# Patient Record
Sex: MALE | Race: WHITE | Employment: OTHER | URBAN - METROPOLITAN AREA
[De-identification: names, ages, dates, MRNs, and addresses within clinical notes are randomized per-mention and may not be internally consistent; named-entity substitution may affect disease eponyms.]

---

## 2017-01-25 ENCOUNTER — TELEPHONE (OUTPATIENT)
Dept: FAMILY MEDICINE CLINIC | Age: 82
End: 2017-01-25

## 2017-06-27 ENCOUNTER — OFFICE VISIT (OUTPATIENT)
Dept: FAMILY MEDICINE CLINIC | Age: 82
End: 2017-06-27

## 2017-06-27 VITALS
HEIGHT: 66 IN | HEART RATE: 88 BPM | OXYGEN SATURATION: 98 % | WEIGHT: 107 LBS | BODY MASS INDEX: 17.19 KG/M2 | SYSTOLIC BLOOD PRESSURE: 132 MMHG | DIASTOLIC BLOOD PRESSURE: 70 MMHG

## 2017-06-27 DIAGNOSIS — R19.7 DIARRHEA, UNSPECIFIED TYPE: Primary | ICD-10-CM

## 2017-06-27 DIAGNOSIS — R10.9 ABDOMINAL CRAMPING: ICD-10-CM

## 2017-06-27 PROCEDURE — 99212 OFFICE O/P EST SF 10 MIN: CPT | Performed by: NURSE PRACTITIONER

## 2017-06-27 ASSESSMENT — ENCOUNTER SYMPTOMS
BLOOD IN STOOL: 0
ABDOMINAL DISTENTION: 0
VOMITING: 0
CONSTIPATION: 0
BELCHING: 0
FLATUS: 0
CRAMPS: 1
RECTAL PAIN: 0
NAUSEA: 0
ABDOMINAL PAIN: 1
ANAL BLEEDING: 0
DIARRHEA: 1
HEMATOCHEZIA: 0

## 2017-10-17 ENCOUNTER — OFFICE VISIT (OUTPATIENT)
Dept: FAMILY MEDICINE CLINIC | Age: 82
End: 2017-10-17

## 2017-10-17 VITALS
SYSTOLIC BLOOD PRESSURE: 124 MMHG | HEART RATE: 70 BPM | WEIGHT: 108 LBS | HEIGHT: 66 IN | OXYGEN SATURATION: 98 % | BODY MASS INDEX: 17.36 KG/M2 | DIASTOLIC BLOOD PRESSURE: 72 MMHG

## 2017-10-17 DIAGNOSIS — M80.00XD AGE-RELATED OSTEOPOROSIS WITH CURRENT PATHOLOGICAL FRACTURE WITH ROUTINE HEALING, SUBSEQUENT ENCOUNTER: ICD-10-CM

## 2017-10-17 DIAGNOSIS — E78.2 MIXED HYPERLIPIDEMIA: Primary | ICD-10-CM

## 2017-10-17 DIAGNOSIS — D64.9 ANEMIA, UNSPECIFIED TYPE: ICD-10-CM

## 2017-10-17 DIAGNOSIS — E78.2 MIXED HYPERLIPIDEMIA: ICD-10-CM

## 2017-10-17 LAB
A/G RATIO: 1.7 (ref 1.1–2.2)
ALBUMIN SERPL-MCNC: 4.3 G/DL (ref 3.4–5)
ALP BLD-CCNC: 97 U/L (ref 40–129)
ALT SERPL-CCNC: 7 U/L (ref 10–40)
ANION GAP SERPL CALCULATED.3IONS-SCNC: 17 MMOL/L (ref 3–16)
AST SERPL-CCNC: 26 U/L (ref 15–37)
BILIRUB SERPL-MCNC: 0.5 MG/DL (ref 0–1)
BUN BLDV-MCNC: 15 MG/DL (ref 7–20)
CALCIUM SERPL-MCNC: 9.6 MG/DL (ref 8.3–10.6)
CHLORIDE BLD-SCNC: 101 MMOL/L (ref 99–110)
CHOLESTEROL, TOTAL: 153 MG/DL (ref 0–199)
CO2: 26 MMOL/L (ref 21–32)
CREAT SERPL-MCNC: 0.8 MG/DL (ref 0.8–1.3)
GFR AFRICAN AMERICAN: >60
GFR NON-AFRICAN AMERICAN: >60
GLOBULIN: 2.5 G/DL
GLUCOSE BLD-MCNC: 94 MG/DL (ref 70–99)
HCT VFR BLD CALC: 38.3 % (ref 40.5–52.5)
HDLC SERPL-MCNC: 61 MG/DL (ref 40–60)
HEMOGLOBIN: 12.8 G/DL (ref 13.5–17.5)
LDL CHOLESTEROL CALCULATED: 80 MG/DL
MCH RBC QN AUTO: 32.3 PG (ref 26–34)
MCHC RBC AUTO-ENTMCNC: 33.5 G/DL (ref 31–36)
MCV RBC AUTO: 96.4 FL (ref 80–100)
PDW BLD-RTO: 14.1 % (ref 12.4–15.4)
PLATELET # BLD: 170 K/UL (ref 135–450)
PMV BLD AUTO: 10.2 FL (ref 5–10.5)
POTASSIUM SERPL-SCNC: 4.3 MMOL/L (ref 3.5–5.1)
RBC # BLD: 3.97 M/UL (ref 4.2–5.9)
SODIUM BLD-SCNC: 144 MMOL/L (ref 136–145)
TOTAL PROTEIN: 6.8 G/DL (ref 6.4–8.2)
TRIGL SERPL-MCNC: 61 MG/DL (ref 0–150)
VLDLC SERPL CALC-MCNC: 12 MG/DL
WBC # BLD: 5.8 K/UL (ref 4–11)

## 2017-10-17 PROCEDURE — 90662 IIV NO PRSV INCREASED AG IM: CPT | Performed by: FAMILY MEDICINE

## 2017-10-17 PROCEDURE — 99214 OFFICE O/P EST MOD 30 MIN: CPT | Performed by: FAMILY MEDICINE

## 2017-10-17 PROCEDURE — G0008 ADMIN INFLUENZA VIRUS VAC: HCPCS | Performed by: FAMILY MEDICINE

## 2017-10-17 ASSESSMENT — ENCOUNTER SYMPTOMS
CHEST TIGHTNESS: 0
RHINORRHEA: 1
COUGH: 0
ABDOMINAL DISTENTION: 0
COLOR CHANGE: 0
EYE REDNESS: 0
EYE DISCHARGE: 0
FACIAL SWELLING: 0
ABDOMINAL PAIN: 0
SHORTNESS OF BREATH: 0

## 2017-10-17 ASSESSMENT — PATIENT HEALTH QUESTIONNAIRE - PHQ9
SUM OF ALL RESPONSES TO PHQ QUESTIONS 1-9: 0
SUM OF ALL RESPONSES TO PHQ9 QUESTIONS 1 & 2: 0
1. LITTLE INTEREST OR PLEASURE IN DOING THINGS: 0
2. FEELING DOWN, DEPRESSED OR HOPELESS: 0

## 2017-10-17 NOTE — PROGRESS NOTES
Subjective:      Patient ID: Kyra Mederos is a 80 y.o. male. Recheck on anemia, hyperlipidemia, and osteoporosis with Compression fracture of lumbar and thoracic vertebra. Bulging disc L4-L5. Hx of failure to thrive with hx of wt loss, but has been stable for the past one and a half yrs. Has not good fitting dentures, per Car Stark, and he cant chew meats so he doesn't eat any. Used to be on fosamax for well over 5 yrs, has discontinued  Past Medical History:   Diagnosis Date    Hyperlipidemia 10/11/2013     Past Surgical History:   Procedure Laterality Date    BACK SURGERY       Social History     Social History    Marital status:      Spouse name: N/A    Number of children: N/A    Years of education: N/A     Occupational History    Not on file. Social History Main Topics    Smoking status: Former Smoker    Smokeless tobacco: Never Used    Alcohol use No    Drug use: Unknown    Sexual activity: Not on file     Other Topics Concern    Not on file     Social History Narrative    No narrative on file       Vitals:    10/17/17 0809   BP: 124/72   Site: Right Arm   Position: Sitting   Cuff Size: Large Adult   Pulse: 70   SpO2: 98%   Weight: 108 lb (49 kg)   Height: 5' 6\" (1.676 m)     Body mass index is 17.43 kg/m². Wt Readings from Last 3 Encounters:   10/17/17 108 lb (49 kg)   06/27/17 107 lb (48.5 kg)   05/11/16 109 lb (49.4 kg)     BP Readings from Last 3 Encounters:   10/17/17 124/72   06/27/17 132/70   05/11/16 112/64        HPI    Review of Systems   Constitutional: Positive for unexpected weight change. HENT: Positive for rhinorrhea. Negative for congestion and facial swelling. Eyes: Negative for discharge and redness. Respiratory: Negative for cough, chest tightness and shortness of breath. Cardiovascular: Negative for chest pain, palpitations and leg swelling. Gastrointestinal: Negative for abdominal distention and abdominal pain.    Genitourinary: Negative for flank

## 2017-12-27 ENCOUNTER — OFFICE VISIT (OUTPATIENT)
Dept: FAMILY MEDICINE CLINIC | Age: 82
End: 2017-12-27

## 2017-12-27 VITALS
HEART RATE: 70 BPM | HEIGHT: 66 IN | DIASTOLIC BLOOD PRESSURE: 86 MMHG | SYSTOLIC BLOOD PRESSURE: 144 MMHG | WEIGHT: 108 LBS | OXYGEN SATURATION: 95 % | BODY MASS INDEX: 17.36 KG/M2

## 2017-12-27 DIAGNOSIS — S22.000A COMPRESSION FRACTURE OF BODY OF THORACIC VERTEBRA (HCC): ICD-10-CM

## 2017-12-27 DIAGNOSIS — K59.00 CONSTIPATION, UNSPECIFIED CONSTIPATION TYPE: ICD-10-CM

## 2017-12-27 DIAGNOSIS — Z23 IMMUNIZATION DUE: ICD-10-CM

## 2017-12-27 DIAGNOSIS — M54.50 ACUTE MIDLINE LOW BACK PAIN WITHOUT SCIATICA: Primary | ICD-10-CM

## 2017-12-27 DIAGNOSIS — R91.8 HILAR MASS: ICD-10-CM

## 2017-12-27 DIAGNOSIS — Z23 NEED FOR PROPHYLACTIC VACCINATION WITH TETANUS-DIPHTHERIA (TD): ICD-10-CM

## 2017-12-27 PROCEDURE — 1123F ACP DISCUSS/DSCN MKR DOCD: CPT | Performed by: NURSE PRACTITIONER

## 2017-12-27 PROCEDURE — G8427 DOCREV CUR MEDS BY ELIG CLIN: HCPCS | Performed by: NURSE PRACTITIONER

## 2017-12-27 PROCEDURE — G0009 ADMIN PNEUMOCOCCAL VACCINE: HCPCS | Performed by: NURSE PRACTITIONER

## 2017-12-27 PROCEDURE — 99213 OFFICE O/P EST LOW 20 MIN: CPT | Performed by: NURSE PRACTITIONER

## 2017-12-27 PROCEDURE — 90670 PCV13 VACCINE IM: CPT | Performed by: NURSE PRACTITIONER

## 2017-12-27 PROCEDURE — G8484 FLU IMMUNIZE NO ADMIN: HCPCS | Performed by: NURSE PRACTITIONER

## 2017-12-27 PROCEDURE — G8419 CALC BMI OUT NRM PARAM NOF/U: HCPCS | Performed by: NURSE PRACTITIONER

## 2017-12-27 PROCEDURE — 1036F TOBACCO NON-USER: CPT | Performed by: NURSE PRACTITIONER

## 2017-12-27 PROCEDURE — 4040F PNEUMOC VAC/ADMIN/RCVD: CPT | Performed by: NURSE PRACTITIONER

## 2017-12-27 RX ORDER — TETANUS AND DIPHTHERIA TOXOIDS ADSORBED 2; 2 [LF]/.5ML; [LF]/.5ML
0.5 INJECTION INTRAMUSCULAR ONCE
Qty: 0.5 ML | Refills: 0 | Status: SHIPPED | OUTPATIENT
Start: 2017-12-27 | End: 2017-12-27

## 2017-12-27 RX ORDER — DOCUSATE SODIUM 100 MG/1
100 CAPSULE, LIQUID FILLED ORAL DAILY PRN
Qty: 30 CAPSULE | Refills: 2 | Status: SHIPPED | OUTPATIENT
Start: 2017-12-27 | End: 2018-10-03 | Stop reason: ALTCHOICE

## 2017-12-27 RX ORDER — LIDOCAINE 50 MG/G
1 PATCH TOPICAL DAILY
Qty: 30 PATCH | Refills: 0 | Status: SHIPPED | OUTPATIENT
Start: 2017-12-27 | End: 2018-10-03

## 2017-12-27 ASSESSMENT — ENCOUNTER SYMPTOMS: BACK PAIN: 1

## 2017-12-27 NOTE — PROGRESS NOTES
Subjective:      Patient ID: Noel Beltrán is a 80 y.o. male. Rodney Mccann is here with complaints of thoracic back pain that started 5 days ago, he was seen at [facility] ER. XR of thoracic spine showed multiple compression fractures and hilar mass. Has had compressions fractures in the past and has undergone lumbar surgery in the past. Was discharged home with tramadol however he can not take due to intolerance (nause and vomiting). Has been taking acetaminophen 500 mg daily which helps somewhat. Denies extremity weakness or loss of control of bowel or bladder. XR THORACIC SPINE AP AND LATERAL   Final Result     1. Previous vertebral augmentation procedure lower thoracic vertebral body. 2. Multiple anterior wedge compressions mid and lower thoracic spine and mid and lower lumbar spine. Age is indeterminant as there are no previous films at [place] [id number]. 3. Right hilar fullness. This could be vascular or mass lesion. Back Pain   This is a new problem. The current episode started in the past 7 days (5 days). The problem has been waxing and waning since onset. The pain is present in the thoracic spine. The quality of the pain is described as aching and stabbing. The pain does not radiate. The pain is at a severity of 8/10. The symptoms are aggravated by lying down. Pertinent negatives include no chest pain, numbness or weakness. Review of Systems   Cardiovascular: Negative for chest pain and leg swelling. Musculoskeletal: Positive for back pain. Negative for arthralgias, gait problem, joint swelling and neck pain. Neurological: Negative for dizziness, facial asymmetry, weakness and numbness. Vitals:    12/27/17 1456   BP: (!) 144/86   Site: Left Arm   Position: Sitting   Pulse: 70   SpO2: 95%   Weight: 108 lb (49 kg)   Height: 5' 6\" (1.676 m)     Objective:   Physical Exam   Constitutional: He is oriented to person, place, and time. He appears well-developed and well-nourished. No distress. HENT:   Head: Normocephalic and atraumatic. Cardiovascular: Normal rate, regular rhythm and normal heart sounds. Exam reveals no gallop and no friction rub. No murmur heard. Pulmonary/Chest: Effort normal and breath sounds normal. No respiratory distress. He has no wheezes. He has no rales. Musculoskeletal:        Thoracic back: He exhibits decreased range of motion, tenderness and bony tenderness. He exhibits no swelling, no edema, no deformity and no laceration. Back:    Neurological: He is alert and oriented to person, place, and time. No cranial nerve deficit. Skin: Skin is warm and dry. No rash noted. He is not diaphoretic. No erythema. No pallor. Nursing note and vitals reviewed. Assessment/Plan:   1. Acute midline low back pain without sciatica  - lidocaine (LIDODERM) 5 %; Place 1 patch onto the skin daily 12 hours on, 12 hours off. Dispense: 30 patch; Refill: 0    2. Compression fracture of body of thoracic vertebra (HCC)  - lidocaine (LIDODERM) 5 %; Place 1 patch onto the skin daily 12 hours on, 12 hours off. Dispense: 30 patch; Refill: 0    3. Hilar mass  - CT Chest W WO Contrast; Future    4. Constipation, unspecified constipation type  - docusate sodium (DOCQLACE) 100 MG capsule; Take 1 capsule by mouth daily as needed for Constipation  Dispense: 30 capsule; Refill: 2    5. Immunization due  - Pneumococcal conjugate vaccine 13-valent    6. Need for prophylactic vaccination with tetanus-diphtheria (TD)  - diptheria-tetanus toxoids (DECAVAC) 2-2 LF/0.5ML injection;  Inject 0.5 mLs into the muscle once for 1 dose  Dispense: 0.5 mL; Refill: 0    - Chest CT to further evaluate hilar fullness seen on thoracic spine XR.  - Lidoderm patches for back pain  - Tylenol 1,000 mg TID PRN for back pain  - continue Colace daily PRN for chronic constipation  - Follow up and further management depending on chest CT results    Patient Instructions   Colace daily as needed for constipation  Lidoderm patch for back pain  CT chest   Tetanus booster at pharmacy    Outpatient Encounter Prescriptions as of 12/27/2017   Medication Sig Dispense Refill    diptheria-tetanus toxoids (DECAVAC) 2-2 LF/0.5ML injection Inject 0.5 mLs into the muscle once for 1 dose 0.5 mL 0    lidocaine (LIDODERM) 5 % Place 1 patch onto the skin daily 12 hours on, 12 hours off.  30 patch 0    docusate sodium (DOCQLACE) 100 MG capsule Take 1 capsule by mouth daily as needed for Constipation 30 capsule 2    atorvastatin (LIPITOR) 10 MG tablet TAKE ONE TABLET BY MOUTH DAILY 30 tablet 11    [DISCONTINUED] DOCQLACE 100 MG capsule TAKE ONE CAPSULE BY MOUTH DAILY AS NEEDED FOR CONSTIPATION 30 capsule 0    sulindac (CLINORIL) 150 MG tablet Take 1 tablet by mouth 2 times daily 30 tablet 1     No facility-administered encounter medications on file as of 12/27/2017.    '

## 2017-12-28 ENCOUNTER — TELEPHONE (OUTPATIENT)
Dept: FAMILY MEDICINE CLINIC | Age: 82
End: 2017-12-28

## 2018-01-02 ENCOUNTER — TELEPHONE (OUTPATIENT)
Dept: FAMILY MEDICINE CLINIC | Age: 83
End: 2018-01-02

## 2018-01-02 RX ORDER — NAPROXEN 500 MG/1
500 TABLET ORAL 2 TIMES DAILY WITH MEALS
Qty: 20 TABLET | Refills: 0 | Status: SHIPPED | OUTPATIENT
Start: 2018-01-02 | End: 2018-01-16 | Stop reason: SDUPTHER

## 2018-01-04 ENCOUNTER — TELEPHONE (OUTPATIENT)
Dept: FAMILY MEDICINE CLINIC | Age: 83
End: 2018-01-04

## 2018-01-04 DIAGNOSIS — R91.8 HILAR MASS: Primary | ICD-10-CM

## 2018-01-05 ENCOUNTER — HOSPITAL ENCOUNTER (OUTPATIENT)
Dept: CT IMAGING | Age: 83
Discharge: HOME | End: 2018-01-05
Attending: NURSE PRACTITIONER | Admitting: NURSE PRACTITIONER

## 2018-01-05 DIAGNOSIS — R91.8 HILAR MASS: ICD-10-CM

## 2018-01-05 DIAGNOSIS — R91.8 OTHER NONSPECIFIC ABNORMAL FINDING OF LUNG FIELD (CODE): ICD-10-CM

## 2018-01-05 LAB
CREAT SERPL-MCNC: 0.8 MG/DL (ref 0.8–1.3)
GFR AFRICAN AMERICAN: >60
GFR NON-AFRICAN AMERICAN: >60

## 2018-01-16 ENCOUNTER — OFFICE VISIT (OUTPATIENT)
Dept: FAMILY MEDICINE CLINIC | Age: 83
End: 2018-01-16

## 2018-01-16 VITALS
OXYGEN SATURATION: 97 % | DIASTOLIC BLOOD PRESSURE: 76 MMHG | WEIGHT: 109 LBS | HEART RATE: 99 BPM | HEIGHT: 67 IN | BODY MASS INDEX: 17.11 KG/M2 | SYSTOLIC BLOOD PRESSURE: 128 MMHG

## 2018-01-16 DIAGNOSIS — S22.000D CLOSED WEDGE FRACTURE OF THORACIC VERTEBRA WITH ROUTINE HEALING, UNSPECIFIED THORACIC VERTEBRAL LEVEL, SUBSEQUENT ENCOUNTER: Primary | ICD-10-CM

## 2018-01-16 PROCEDURE — 99213 OFFICE O/P EST LOW 20 MIN: CPT | Performed by: NURSE PRACTITIONER

## 2018-01-16 PROCEDURE — G8484 FLU IMMUNIZE NO ADMIN: HCPCS | Performed by: NURSE PRACTITIONER

## 2018-01-16 PROCEDURE — 1036F TOBACCO NON-USER: CPT | Performed by: NURSE PRACTITIONER

## 2018-01-16 PROCEDURE — 1123F ACP DISCUSS/DSCN MKR DOCD: CPT | Performed by: NURSE PRACTITIONER

## 2018-01-16 PROCEDURE — G8427 DOCREV CUR MEDS BY ELIG CLIN: HCPCS | Performed by: NURSE PRACTITIONER

## 2018-01-16 PROCEDURE — G8419 CALC BMI OUT NRM PARAM NOF/U: HCPCS | Performed by: NURSE PRACTITIONER

## 2018-01-16 PROCEDURE — 4040F PNEUMOC VAC/ADMIN/RCVD: CPT | Performed by: NURSE PRACTITIONER

## 2018-01-16 RX ORDER — NAPROXEN 500 MG/1
500 TABLET ORAL 2 TIMES DAILY WITH MEALS
Qty: 60 TABLET | Refills: 0 | Status: SHIPPED | OUTPATIENT
Start: 2018-01-16 | End: 2018-10-03 | Stop reason: ALTCHOICE

## 2018-01-16 ASSESSMENT — ENCOUNTER SYMPTOMS
BOWEL INCONTINENCE: 0
BACK PAIN: 1
ABDOMINAL PAIN: 0

## 2018-01-16 NOTE — PROGRESS NOTES
Subjective:      Patient ID: Denny Boswell is a 80 y.o. male. Chris Gonzalez is here for follow up on back pain and vertebral compression fractures. Pain started on 12/24/2017 in mid back. He was evaluated at [facility] ER on 12/27/2017. XR of thoracic and lumbar spine showed \" multiple anterior wedge compressions of mid and lower thoracic spine and mid and lower lumbar spine. \" Denies fall or injury mechanism. He has been using acetaminophen and naprosyn for pain. He pain has improved moderately. Naprosyn is the most helpful for pain. Some days does not have pain at all. His daughter is concerned because he continues to be very active and has not been resting enough. He denies any leg weakness, loss of control or bowel or bladder or leg paresthesias. Back Pain   This is a new problem. The current episode started 1 to 4 weeks ago (3 weeks). The problem occurs intermittently. The problem has been waxing and waning since onset. The pain is present in the thoracic spine. The quality of the pain is described as aching. The pain does not radiate. The pain is at a severity of 4/10. The symptoms are aggravated by bending, lying down and position. Pertinent negatives include no abdominal pain, bladder incontinence, bowel incontinence, dysuria, leg pain, numbness, paresis, paresthesias, tingling or weakness. Risk factors include history of osteoporosis. He has tried NSAIDs and analgesics for the symptoms. The treatment provided moderate relief. Review of Systems   Gastrointestinal: Negative for abdominal pain and bowel incontinence. Genitourinary: Negative for bladder incontinence and dysuria. Musculoskeletal: Positive for back pain. Neurological: Negative for tingling, weakness, numbness and paresthesias.      Vitals:    01/16/18 0916   BP: 128/76   Site: Left Arm   Position: Sitting   Pulse: 99   SpO2: 97%   Weight: 109 lb (49.4 kg)   Height: 5' 7\" (1.702 m)     Objective:   Physical Exam   Constitutional: He

## 2018-01-23 RX ORDER — ATORVASTATIN CALCIUM 10 MG/1
TABLET, FILM COATED ORAL
Qty: 90 TABLET | Refills: 2 | Status: SHIPPED | OUTPATIENT
Start: 2018-01-23 | End: 2019-03-13 | Stop reason: SDUPTHER

## 2018-01-26 ENCOUNTER — TELEPHONE (OUTPATIENT)
Dept: FAMILY MEDICINE CLINIC | Age: 83
End: 2018-01-26

## 2018-10-03 ENCOUNTER — OFFICE VISIT (OUTPATIENT)
Dept: FAMILY MEDICINE CLINIC | Age: 83
End: 2018-10-03
Payer: MEDICARE

## 2018-10-03 VITALS
BODY MASS INDEX: 16.48 KG/M2 | SYSTOLIC BLOOD PRESSURE: 120 MMHG | OXYGEN SATURATION: 98 % | HEIGHT: 67 IN | WEIGHT: 105 LBS | HEART RATE: 56 BPM | DIASTOLIC BLOOD PRESSURE: 88 MMHG

## 2018-10-03 DIAGNOSIS — H61.92 SKIN LESION OF LEFT EAR: ICD-10-CM

## 2018-10-03 DIAGNOSIS — M51.36 BULGING LUMBAR DISC: ICD-10-CM

## 2018-10-03 DIAGNOSIS — F32.1 CURRENT MODERATE EPISODE OF MAJOR DEPRESSIVE DISORDER WITHOUT PRIOR EPISODE (HCC): Primary | ICD-10-CM

## 2018-10-03 DIAGNOSIS — L98.9 NON-HEALING SKIN LESION OF NOSE: ICD-10-CM

## 2018-10-03 DIAGNOSIS — F43.21 GRIEVING: ICD-10-CM

## 2018-10-03 PROCEDURE — 1101F PT FALLS ASSESS-DOCD LE1/YR: CPT | Performed by: FAMILY MEDICINE

## 2018-10-03 PROCEDURE — 99214 OFFICE O/P EST MOD 30 MIN: CPT | Performed by: FAMILY MEDICINE

## 2018-10-03 PROCEDURE — 1123F ACP DISCUSS/DSCN MKR DOCD: CPT | Performed by: FAMILY MEDICINE

## 2018-10-03 PROCEDURE — 4040F PNEUMOC VAC/ADMIN/RCVD: CPT | Performed by: FAMILY MEDICINE

## 2018-10-03 PROCEDURE — G8510 SCR DEP NEG, NO PLAN REQD: HCPCS | Performed by: FAMILY MEDICINE

## 2018-10-03 PROCEDURE — G0008 ADMIN INFLUENZA VIRUS VAC: HCPCS | Performed by: FAMILY MEDICINE

## 2018-10-03 PROCEDURE — G8427 DOCREV CUR MEDS BY ELIG CLIN: HCPCS | Performed by: FAMILY MEDICINE

## 2018-10-03 PROCEDURE — G8419 CALC BMI OUT NRM PARAM NOF/U: HCPCS | Performed by: FAMILY MEDICINE

## 2018-10-03 PROCEDURE — G8482 FLU IMMUNIZE ORDER/ADMIN: HCPCS | Performed by: FAMILY MEDICINE

## 2018-10-03 PROCEDURE — 1036F TOBACCO NON-USER: CPT | Performed by: FAMILY MEDICINE

## 2018-10-03 PROCEDURE — 90662 IIV NO PRSV INCREASED AG IM: CPT | Performed by: FAMILY MEDICINE

## 2018-10-03 RX ORDER — SERTRALINE HYDROCHLORIDE 25 MG/1
25 TABLET, FILM COATED ORAL DAILY
Qty: 30 TABLET | Refills: 5 | Status: SHIPPED | OUTPATIENT
Start: 2018-10-03 | End: 2019-06-12

## 2018-10-03 RX ORDER — LATANOPROST 50 UG/ML
SOLUTION/ DROPS OPHTHALMIC
COMMUNITY
Start: 2018-10-01 | End: 2020-01-01 | Stop reason: ALTCHOICE

## 2018-10-03 ASSESSMENT — PATIENT HEALTH QUESTIONNAIRE - PHQ9
2. FEELING DOWN, DEPRESSED OR HOPELESS: 1
SUM OF ALL RESPONSES TO PHQ9 QUESTIONS 1 & 2: 2
SUM OF ALL RESPONSES TO PHQ QUESTIONS 1-9: 0
SUM OF ALL RESPONSES TO PHQ QUESTIONS 1-9: 2
SUM OF ALL RESPONSES TO PHQ QUESTIONS 1-9: 0
2. FEELING DOWN, DEPRESSED OR HOPELESS: 0
1. LITTLE INTEREST OR PLEASURE IN DOING THINGS: 1
SUM OF ALL RESPONSES TO PHQ9 QUESTIONS 1 & 2: 0
1. LITTLE INTEREST OR PLEASURE IN DOING THINGS: 0
SUM OF ALL RESPONSES TO PHQ QUESTIONS 1-9: 2

## 2018-10-03 ASSESSMENT — ENCOUNTER SYMPTOMS
CONSTIPATION: 0
SHORTNESS OF BREATH: 0
DIARRHEA: 0
EYE REDNESS: 0
ROS SKIN COMMENTS: SKIN LESIONS
NAUSEA: 0
VOMITING: 0

## 2018-10-08 ENCOUNTER — INITIAL CONSULT (OUTPATIENT)
Dept: SURGERY | Age: 83
End: 2018-10-08
Payer: MEDICARE

## 2018-10-08 VITALS
WEIGHT: 104 LBS | HEIGHT: 67 IN | BODY MASS INDEX: 16.32 KG/M2 | SYSTOLIC BLOOD PRESSURE: 120 MMHG | DIASTOLIC BLOOD PRESSURE: 82 MMHG

## 2018-10-08 DIAGNOSIS — D48.5 NEOPLASM OF UNCERTAIN BEHAVIOR OF SKIN: Primary | ICD-10-CM

## 2018-10-08 PROCEDURE — G8419 CALC BMI OUT NRM PARAM NOF/U: HCPCS | Performed by: SURGERY

## 2018-10-08 PROCEDURE — G8482 FLU IMMUNIZE ORDER/ADMIN: HCPCS | Performed by: SURGERY

## 2018-10-08 PROCEDURE — G8427 DOCREV CUR MEDS BY ELIG CLIN: HCPCS | Performed by: SURGERY

## 2018-10-08 PROCEDURE — 4040F PNEUMOC VAC/ADMIN/RCVD: CPT | Performed by: SURGERY

## 2018-10-08 PROCEDURE — 1036F TOBACCO NON-USER: CPT | Performed by: SURGERY

## 2018-10-08 PROCEDURE — 1101F PT FALLS ASSESS-DOCD LE1/YR: CPT | Performed by: SURGERY

## 2018-10-08 PROCEDURE — 99202 OFFICE O/P NEW SF 15 MIN: CPT | Performed by: SURGERY

## 2018-10-08 PROCEDURE — 1123F ACP DISCUSS/DSCN MKR DOCD: CPT | Performed by: SURGERY

## 2018-10-10 NOTE — PROGRESS NOTES
No distress. Eyes:  No scleral icterus  Ears:  Normal  Nose:  Normal  Mouth:  Mucous membranes moist  Respiratory: Lungs CTA. No accessory muscle use. Heart:  Regular rhythm  Abdomen:  Soft. Non tender. Non distended. Musculoskeletal:  No abnormal movements. ROM extremities normal.  Skin:  No rashes. Lesion    Location:   L ear and nose   Pigmented:   Mild erythema . Indistinct borders and smooth. Diameter:  0.75 cm   Crusting absent             Neurologic:  No focal deficits. Psychiatric:  AAA. O x 3.            ASSESSMENT:  1. Neoplasm of uncertain behavior of skin            PLAN:  I advised observation at present based on how these lesions appear. I have also referred him for dermatology evaluation to see if there are topical treatment recommendations.

## 2018-11-01 ENCOUNTER — OFFICE VISIT (OUTPATIENT)
Dept: DERMATOLOGY | Age: 83
End: 2018-11-01
Payer: MEDICARE

## 2018-11-01 DIAGNOSIS — Z87.891 FORMER SMOKER: ICD-10-CM

## 2018-11-01 DIAGNOSIS — L57.0 ACTINIC KERATOSES: Primary | ICD-10-CM

## 2018-11-01 DIAGNOSIS — L81.4 SOLAR LENTIGO: ICD-10-CM

## 2018-11-01 PROCEDURE — G8427 DOCREV CUR MEDS BY ELIG CLIN: HCPCS | Performed by: DERMATOLOGY

## 2018-11-01 PROCEDURE — 1101F PT FALLS ASSESS-DOCD LE1/YR: CPT | Performed by: DERMATOLOGY

## 2018-11-01 PROCEDURE — 99201 PR OFFICE OUTPATIENT NEW 10 MINUTES: CPT | Performed by: DERMATOLOGY

## 2018-11-01 PROCEDURE — 1123F ACP DISCUSS/DSCN MKR DOCD: CPT | Performed by: DERMATOLOGY

## 2018-11-01 PROCEDURE — G8419 CALC BMI OUT NRM PARAM NOF/U: HCPCS | Performed by: DERMATOLOGY

## 2018-11-01 PROCEDURE — 17003 DESTRUCT PREMALG LES 2-14: CPT | Performed by: DERMATOLOGY

## 2018-11-01 PROCEDURE — 1036F TOBACCO NON-USER: CPT | Performed by: DERMATOLOGY

## 2018-11-01 PROCEDURE — G8482 FLU IMMUNIZE ORDER/ADMIN: HCPCS | Performed by: DERMATOLOGY

## 2018-11-01 PROCEDURE — 4040F PNEUMOC VAC/ADMIN/RCVD: CPT | Performed by: DERMATOLOGY

## 2018-11-01 PROCEDURE — 17000 DESTRUCT PREMALG LESION: CPT | Performed by: DERMATOLOGY

## 2018-11-01 RX ORDER — TIMOLOL 5.12 MG/ML
SOLUTION/ DROPS OPHTHALMIC
COMMUNITY
Start: 2018-10-05 | End: 2020-01-01 | Stop reason: ALTCHOICE

## 2018-11-01 NOTE — PROGRESS NOTES
PREMALIGNANT,2-14 LESIONS    2. Solar lentigo  Reassurance  Observation, pt to call if changes in size, shape, color or experiences bleeding/pain/itching    3. Former smoker  -continue with tobacco cessation        Note is transcribed using voice recognition software. Inadvertent computerized transcription errors may be present. Return if symptoms worsen or fail to improve.

## 2019-01-08 ENCOUNTER — OFFICE VISIT (OUTPATIENT)
Dept: FAMILY MEDICINE CLINIC | Age: 84
End: 2019-01-08
Payer: MEDICARE

## 2019-01-08 ENCOUNTER — HOSPITAL ENCOUNTER (OUTPATIENT)
Dept: GENERAL RADIOLOGY | Age: 84
Discharge: HOME | End: 2019-01-08
Payer: MEDICARE

## 2019-01-08 VITALS
WEIGHT: 104 LBS | BODY MASS INDEX: 16.32 KG/M2 | HEIGHT: 67 IN | HEART RATE: 74 BPM | OXYGEN SATURATION: 97 % | SYSTOLIC BLOOD PRESSURE: 122 MMHG | DIASTOLIC BLOOD PRESSURE: 76 MMHG

## 2019-01-08 DIAGNOSIS — M79.605 PAIN OF LEFT LOWER EXTREMITY: ICD-10-CM

## 2019-01-08 DIAGNOSIS — F32.1 CURRENT MODERATE EPISODE OF MAJOR DEPRESSIVE DISORDER WITHOUT PRIOR EPISODE (HCC): Primary | ICD-10-CM

## 2019-01-08 DIAGNOSIS — E78.5 DYSLIPIDEMIA: ICD-10-CM

## 2019-01-08 DIAGNOSIS — Z71.3 DIETARY COUNSELING: ICD-10-CM

## 2019-01-08 DIAGNOSIS — M81.8 OTHER OSTEOPOROSIS, UNSPECIFIED PATHOLOGICAL FRACTURE PRESENCE: ICD-10-CM

## 2019-01-08 PROCEDURE — G8427 DOCREV CUR MEDS BY ELIG CLIN: HCPCS | Performed by: FAMILY MEDICINE

## 2019-01-08 PROCEDURE — 90715 TDAP VACCINE 7 YRS/> IM: CPT | Performed by: FAMILY MEDICINE

## 2019-01-08 PROCEDURE — 1036F TOBACCO NON-USER: CPT | Performed by: FAMILY MEDICINE

## 2019-01-08 PROCEDURE — 4040F PNEUMOC VAC/ADMIN/RCVD: CPT | Performed by: FAMILY MEDICINE

## 2019-01-08 PROCEDURE — G8419 CALC BMI OUT NRM PARAM NOF/U: HCPCS | Performed by: FAMILY MEDICINE

## 2019-01-08 PROCEDURE — 99214 OFFICE O/P EST MOD 30 MIN: CPT | Performed by: FAMILY MEDICINE

## 2019-01-08 PROCEDURE — 1101F PT FALLS ASSESS-DOCD LE1/YR: CPT | Performed by: FAMILY MEDICINE

## 2019-01-08 PROCEDURE — 90471 IMMUNIZATION ADMIN: CPT | Performed by: FAMILY MEDICINE

## 2019-01-08 PROCEDURE — G8482 FLU IMMUNIZE ORDER/ADMIN: HCPCS | Performed by: FAMILY MEDICINE

## 2019-01-08 PROCEDURE — 73552 X-RAY EXAM OF FEMUR 2/>: CPT

## 2019-01-08 PROCEDURE — G8418 CALC BMI BLW LOW PARAM F/U: HCPCS | Performed by: FAMILY MEDICINE

## 2019-01-08 PROCEDURE — 1123F ACP DISCUSS/DSCN MKR DOCD: CPT | Performed by: FAMILY MEDICINE

## 2019-01-08 ASSESSMENT — ENCOUNTER SYMPTOMS
VOMITING: 0
NAUSEA: 0
DIARRHEA: 0
CONSTIPATION: 0
SHORTNESS OF BREATH: 0
EYE REDNESS: 0

## 2019-01-09 DIAGNOSIS — E78.5 DYSLIPIDEMIA: ICD-10-CM

## 2019-01-09 DIAGNOSIS — M81.8 OTHER OSTEOPOROSIS, UNSPECIFIED PATHOLOGICAL FRACTURE PRESENCE: ICD-10-CM

## 2019-01-09 DIAGNOSIS — F32.1 CURRENT MODERATE EPISODE OF MAJOR DEPRESSIVE DISORDER WITHOUT PRIOR EPISODE (HCC): ICD-10-CM

## 2019-01-09 LAB
A/G RATIO: 1.8 (ref 1.1–2.2)
ALBUMIN SERPL-MCNC: 4.5 G/DL (ref 3.4–5)
ALP BLD-CCNC: 92 U/L (ref 40–129)
ALT SERPL-CCNC: 8 U/L (ref 10–40)
ANION GAP SERPL CALCULATED.3IONS-SCNC: 10 MMOL/L (ref 3–16)
AST SERPL-CCNC: 26 U/L (ref 15–37)
BILIRUB SERPL-MCNC: 0.7 MG/DL (ref 0–1)
BUN BLDV-MCNC: 13 MG/DL (ref 7–20)
CALCIUM SERPL-MCNC: 9.7 MG/DL (ref 8.3–10.6)
CHLORIDE BLD-SCNC: 103 MMOL/L (ref 99–110)
CHOLESTEROL, TOTAL: 154 MG/DL (ref 0–199)
CO2: 28 MMOL/L (ref 21–32)
CREAT SERPL-MCNC: 0.8 MG/DL (ref 0.8–1.3)
GFR AFRICAN AMERICAN: >60
GFR NON-AFRICAN AMERICAN: >60
GLOBULIN: 2.5 G/DL
GLUCOSE BLD-MCNC: 93 MG/DL (ref 70–99)
HCT VFR BLD CALC: 40.3 % (ref 40.5–52.5)
HDLC SERPL-MCNC: 57 MG/DL (ref 40–60)
HEMOGLOBIN: 13.4 G/DL (ref 13.5–17.5)
LDL CHOLESTEROL CALCULATED: 83 MG/DL
MCH RBC QN AUTO: 32.8 PG (ref 26–34)
MCHC RBC AUTO-ENTMCNC: 33.3 G/DL (ref 31–36)
MCV RBC AUTO: 98.5 FL (ref 80–100)
PDW BLD-RTO: 14.2 % (ref 12.4–15.4)
PLATELET # BLD: 168 K/UL (ref 135–450)
PMV BLD AUTO: 10.6 FL (ref 5–10.5)
POTASSIUM SERPL-SCNC: 5.4 MMOL/L (ref 3.5–5.1)
RBC # BLD: 4.09 M/UL (ref 4.2–5.9)
SODIUM BLD-SCNC: 141 MMOL/L (ref 136–145)
TOTAL PROTEIN: 7 G/DL (ref 6.4–8.2)
TRIGL SERPL-MCNC: 69 MG/DL (ref 0–150)
VITAMIN D 25-HYDROXY: 24 NG/ML
VLDLC SERPL CALC-MCNC: 14 MG/DL
WBC # BLD: 6.8 K/UL (ref 4–11)

## 2019-01-10 ENCOUNTER — HOSPITAL ENCOUNTER (OUTPATIENT)
Dept: ULTRASOUND IMAGING | Age: 84
Discharge: HOME | End: 2019-01-10
Payer: MEDICARE

## 2019-01-10 DIAGNOSIS — M79.605 PAIN OF LEFT LOWER EXTREMITY: ICD-10-CM

## 2019-01-10 PROCEDURE — 93971 EXTREMITY STUDY: CPT

## 2019-04-16 ENCOUNTER — OFFICE VISIT (OUTPATIENT)
Dept: FAMILY MEDICINE CLINIC | Age: 84
End: 2019-04-16
Payer: MEDICARE

## 2019-04-16 VITALS
HEART RATE: 81 BPM | WEIGHT: 105 LBS | SYSTOLIC BLOOD PRESSURE: 118 MMHG | OXYGEN SATURATION: 95 % | HEIGHT: 67 IN | DIASTOLIC BLOOD PRESSURE: 76 MMHG | BODY MASS INDEX: 16.48 KG/M2

## 2019-04-16 DIAGNOSIS — E87.5 HYPERKALEMIA: ICD-10-CM

## 2019-04-16 DIAGNOSIS — Z00.00 ROUTINE GENERAL MEDICAL EXAMINATION AT A HEALTH CARE FACILITY: Primary | ICD-10-CM

## 2019-04-16 DIAGNOSIS — E55.9 VITAMIN D INSUFFICIENCY: ICD-10-CM

## 2019-04-16 LAB
POTASSIUM SERPL-SCNC: 5 MMOL/L (ref 3.5–5.1)
VITAMIN D 25-HYDROXY: 25.5 NG/ML

## 2019-04-16 PROCEDURE — G0438 PPPS, INITIAL VISIT: HCPCS | Performed by: FAMILY MEDICINE

## 2019-04-16 PROCEDURE — 4040F PNEUMOC VAC/ADMIN/RCVD: CPT | Performed by: FAMILY MEDICINE

## 2019-04-16 ASSESSMENT — LIFESTYLE VARIABLES: HOW OFTEN DO YOU HAVE A DRINK CONTAINING ALCOHOL: 0

## 2019-04-16 ASSESSMENT — PATIENT HEALTH QUESTIONNAIRE - PHQ9
SUM OF ALL RESPONSES TO PHQ QUESTIONS 1-9: 0
SUM OF ALL RESPONSES TO PHQ QUESTIONS 1-9: 0

## 2019-04-16 ASSESSMENT — ANXIETY QUESTIONNAIRES: GAD7 TOTAL SCORE: 0

## 2019-04-16 NOTE — PATIENT INSTRUCTIONS
Personalized Preventive Plan for Kamaljit Méndez - 4/16/2019  Medicare offers a range of preventive health benefits. Some of the tests and screenings are paid in full while other may be subject to a deductible, co-insurance, and/or copay. Some of these benefits include a comprehensive review of your medical history including lifestyle, illnesses that may run in your family, and various assessments and screenings as appropriate. After reviewing your medical record and screening and assessments performed today your provider may have ordered immunizations, labs, imaging, and/or referrals for you. A list of these orders (if applicable) as well as your Preventive Care list are included within your After Visit Summary for your review. Other Preventive Recommendations:    · A preventive eye exam performed by an eye specialist is recommended every 1-2 years to screen for glaucoma; cataracts, macular degeneration, and other eye disorders. · A preventive dental visit is recommended every 6 months. · Try to get at least 150 minutes of exercise per week or 10,000 steps per day on a pedometer . · Order or download the FREE \"Exercise & Physical Activity: Your Everyday Guide\" from [employer]. Call 7-462.219.1361 or search [employer] online. · You need 2033-1783 mg of calcium and 9428-3189 IU of vitamin D per day. It is possible to meet your calcium requirement with diet alone, but a vitamin D supplement is usually necessary to meet this goal.  · When exposed to the sun, use a sunscreen that protects against both UVA and UVB radiation with an SPF of 30 or greater. Reapply every 2 to 3 hours or after sweating, drying off with a towel, or swimming. · Always wear a seat belt when traveling in a car. Always wear a helmet when riding a bicycle or motorcycle. Living Will    A living will is a legal form you use to write down the kind of care you want at the end of your life.  It is online registry. This is a place where you can store your living will online so the doctors and nurses who need to treat you can find it right away. What should you think about when creating a living will? Talk about your end-of-life wishes with your family members and your doctor. Let them know what you want. That way the people making decisions for you won't be surprised by your choices. Think about these questions as you make your living will:  Do you know enough about life support methods that might be used? If not, talk to your doctor so you know what might be done if you can't breathe on your own, your heart stops, or you're unable to swallow. What things would you still want to be able to do after you receive life-support methods? Would you want to be able to walk? To speak? To eat on your own? To live without the help of machines? If you have a choice, where do you want to be cared for? In your home? At a hospital or nursing home? Do you want certain Zoroastrian practices performed if you become very ill? If you have a choice at the end of your life, where would you prefer to die? At home? In a hospital or nursing home? Somewhere else? Would you prefer to be buried or cremated? Do you want your organs to be donated after you die? What should you do with your living will? Make sure that your family members and your health care agent have copies of your living will. Give your doctor a copy of your living will to keep in your medical record. If you have more than one doctor, make sure that each one has a copy. You may want to put a copy of your living will where it can be easily found. Where can you learn more? Go to https://chkarl.eTipping. org and sign in to your [employer] account. Enter R277 in the [employer] box to learn more about \"Learning About Living [place]. \"     If you do not have an account, please click on the \"Sign Up Now\" link.   Current as of: April 19, 2018  Content Version: 11.8  © 5297-3057 Healthwise, Incorporated. Care instructions adapted under license by [facility] ([facility]). If you have questions about a medical condition or this instruction, always ask your healthcare professional. [address] any warranty or liability for your use of this information. FALL PREVENTION    Getting around your home safely can be a challenge if you have injuries or health problems that make it easy for you to fall. Loose rugs and furniture in walkways are among the dangers for many older people who have problems walking or who have poor eyesight. People who have conditions such as arthritis, osteoporosis, or dementia also have to be careful not to fall. You can make your home safer with a few simple measures. Follow-up care is a key part of your treatment and safety. Be sure to make and go to all appointments, and call your doctor if you are having problems. It's also a good idea to know your test results and keep a list of the medicines you take. How can you care for yourself at home? Taking care of yourself  You may get dizzy if you do not drink enough water. To prevent dehydration, drink plenty of fluids, enough so that your urine is light yellow or clear like water. Choose water and other caffeine-free clear liquids. If you have kidney, heart, or liver disease and have to limit fluids, talk with your doctor before you increase the amount of fluids you drink. Exercise regularly to improve your strength, muscle tone, and balance. Walk if you can. Swimming may be a good choice if you cannot walk easily. Have your vision and hearing checked each year or any time you notice a change. If you have trouble seeing and hearing, you might not be able to avoid objects and could lose your balance. Know the side effects of the medicines you take. Ask your doctor or pharmacist whether the medicines you take can affect your balance.  Sleeping pills or sedatives can affect your balance. Limit the amount of alcohol you drink. Alcohol can impair your balance and other senses. Ask your doctor whether calluses or corns on your feet need to be removed. If you wear loose-fitting shoes because of calluses or corns, you can lose your balance and fall. Preventing falls at home  Remove raised doorway thresholds, throw rugs, and clutter. Repair loose carpet or raised areas in the floor. Move furniture and electrical cords to keep them out of walking paths. Use nonskid floor wax, and wipe up spills right away, especially on ceramic tile floors. If you use a walker or cane, put rubber tips on it. If you use crutches, clean the bottoms of them regularly with an abrasive pad, such as steel wool. Keep your house well lit, especially stairways, porches, and outside walkways. Use night-lights in areas such as hallways and bathrooms. Add extra light switches or use remote switches (such as switches that go on or off when you clap your hands) to make it easier to turn lights on if you have to get up during the night. Install sturdy handrails on stairways. Move items in your cabinets so that the things you use a lot are on the lower shelves (about waist level). Keep a cordless phone and a flashlight with new batteries by your bed. If possible, put a phone in each of the main rooms of your house, or carry a cell phone in case you fall and cannot reach a phone. Or, you can wear a device around your neck or wrist. You push a button that sends a signal for help. Wear low-heeled shoes that fit well and give your feet good support. Use footwear with nonskid soles. Check the heels and soles of your shoes for wear. Repair or replace worn heels or soles. Do not wear socks without shoes on wood floors. Walk on the grass when the sidewalks are slippery. If you live in an area that gets snow and ice in the winter, sprinkle salt on slippery steps and sidewalks.   Preventing falls in the cholesterol should be above 40  The LDL, the bad cholesterol should be below 100. Although it can be as high as 130 if no risk factors for heart disease or no diabetes. Improve your cholesterol profile:     Cardiovascular exercise helps. Start with 15 minutes three times a week and the work up to at least 30 minutes 5 days a week. Exercise at a level that you can carry on a conversation during. Loose extra pounds. Pay attention to your serving sides and avoid eating second helpings at meals. Significantly decrease the amount of processed food, junk food, and fast food that you eat. Decrease animal sources of saturated fats, and completely avoid and eliminate  hydrogenated oils and trans fats. Check the Food Label on the food you eat and avoid foods that have hydrogenated vegetable oils in them. That includes bakery products. Meat and cheese generally contains saturated fat. Drink skim milk which contains no fat. Increase the amount of fresh food that you cook yourself. Eat at least five servings of fruits and vegetables a day. Increase the portion of your plate that contains the fruit and vegetables to at least 50%, and decrease the amount of starches like potatoes, rice, pasta to no more than 25% of your plate. Increase your fiber intake. Fiber is found in fruits and vegetables as well as whole grains, oatmeal,  and beans. A diet with at least 5 servings of fruits and vegetables should give you about 10-20grams of fiber daily. Switch to monounsaturated fats like olive oil. . Use these types of fats where you would have used butter. Fat from olives, avocados, coconut, or other nuts is okay. Add baked or grilled fish to you diet at least 1-2 times a week. Consider an Omega 3 fatty acid supplement which can raise the HDL good cholesterol, and possible decrease the triglycerides in some patients. 2-4 grams a day is recommended.      Learn more about cholesterol on the Internet. Check out these resources:    American Heart Association   Heart. [address]:   [employer]    Triglycerides can be lowered without medication by exercising, and loosing weight and eating a diet lower in carbohydrates especially from flour sources,  and avoiding simple sugars. Omega 3 fatty acids can help lower triglyceride levels, 2-4 grams a day. The good cholesterol is HDL. In order to increase the good cholesterol, increasing cardiovascular exercise helps. Avoid hydrogenated oils (trans fats) in processed, bakery,  and junk food. Use monounsaturated fats such as olive oil can help raise the good cholesterol. For your weight, exercise 30 minutes 5 times weekly and improve the types of food you eat:    Protein  Best options: The American Diabetes Association (ADA) recommends lean proteins low in saturated fat, like fish or turkey. Aim for two servings of seafood each week; some fish, like salmon, have the added benefit of containing heart healthy omega-3 fats. For a vegetarian protein source, experiment with the wide variety of beans. Nuts, which are protein and healthy fats powerhouses, are also a choice. Worst options: Processed deli meats and hot dogs have high amounts of fat along with lots of sodium, which can increase the risk of high blood pressure. Heart attack and stroke are two common complications of diabetes, so keeping blood pressure in check is important. Grains  Best options: When choosing grains, make sure theyre whole. Decrease the amount of foods which contain flour. Whole grains such as wild rice, quinoa, and whole grain breads and cereals contain fiber, which is beneficial for digestive health, but often the grains flour products raise your blood sugar and when your blood sugar returns to normal, it can trigger the desire to eat again. Worst options: Refined white flour doesnt contain the same health benefits as whole grains.  Processed foods made with white flour include breakfast cereals, white bread, and pastries, cookies, muffins, pretzels, crackers, so avoid these options. Also try to steer clear of white rice and pasta. Dairy  Best options: With only 6 to 8 grams of carbohydrates in a serving, plain nonfat [place] yogurt is a healthy and versatile dairy option. You can add berries and enjoy it for dessert or breakfast; you can use it in recipes as a replacement for sour cream, which is high in saturated fat. Skim milk. Worst options: Avoid all full-fat dairy products and especially packaged chocolate milk, as it also has added sugar. Avoid yogurts with added sugar. Vegetables  Best options: Non-starchy vegetables such as leafy greens, broccoli, cauliflower, asparagus, and carrots are low in carbohydrates and high in fiber and other nutrients, Lashell Hanson says. You can eat non-starchy vegetables in abundance -- half of your plate should be filled with these veggies. If youre craving mashed potatoes, give mashed cauliflower a try. Worst options: Stick to small portions of starchy vegetables such as corn, potatoes, and peas. These items are nutritious, but should be eaten in moderation. The ADA groups them with grains because of their high carb content. Fruit  Best options: Fresh fruit can conquer your craving for sweets while providing antioxidants and fiber. Berries are a great option because recommended portion sizes are typically generous, which may leave you feeling more satisfied  Worst options: Avoid added sugar by eliminating fruits canned in syrup, and be aware that dried fruits have a very high sugar concentration. Also, fruit juices should be consumed rarely as theyre high in sugar and dont contain the same nutrients as whole fruit. Fats  Best options: Some types of fat actually help protect your heart.  Choose the monounsaturated fats found in avocados, almonds, olives, and pecans or the polyunsaturated fats found in walnuts and sunflower oil, which can

## 2019-04-16 NOTE — PROGRESS NOTES
Medicare Annual Wellness Visit  Name: Parth Lewis Date: 2019   MRN: [id number] Sex: Male   Age: 80 y.o. Ethnicity: Non-/Non    : 1931 Race: Tayo Martinez is here for Medicare AWV    Screenings for behavioral, psychosocial and functional/safety risks, and cognitive dysfunction are all negative except as indicated below. These results, as well as other patient data from the [address] form, are documented in Flowsheets linked to this Encounter. He also needs to recheck on his high potassium and low vitamin D as taken thousand units of vitamin D daily. Allergies   Allergen Reactions    Diclofenac Rash    Tramadol Nausea And Vomiting     Prior to Visit Medications    Medication Sig Taking?  Authorizing Provider   atorvastatin (LIPITOR) 10 MG tablet TAKE ONE TABLET BY MOUTH DAILY Yes Lea Aranda DO   BETIMOL 0.5 % ophthalmic solution  Yes Historical Provider, MD   latanoprost (XALATAN) 0.005 % ophthalmic solution  Yes Historical Provider, MD   sertraline (ZOLOFT) 25 MG tablet Take 1 tablet by mouth daily Yes Lea Aranda DO     Past Medical History:   Diagnosis Date    Current moderate episode of major depressive disorder without prior episode ([facility] [address] 10/3/2018    Hyperlipidemia 10/11/2013     Past Surgical History:   Procedure Laterality Date    BACK SURGERY       Family History   Problem Relation Age of Onset    Cancer Other         Melanoma     Other Neg Hx        CareTeam (Including outside providers/suppliers regularly involved in providing care):   Patient Care Team:  Lea Aranda DO as PCP - General (Family Medicine)  Lea Aranda DO as PCP - [facility] Attributed Provider    Wt Readings from Last 3 Encounters:   19 105 lb (47.6 kg)   19 104 lb (47.2 kg)   10/08/18 104 lb (47.2 kg)     Vitals:    19 1109   BP: 118/76   Site: Left Upper Arm   Position: Sitting   Cuff Size: Medium Adult   Pulse: 81   SpO2: 95%   Weight: 105 lb (47.6 kg) Height: 5' 7\" (1.702 m)     Body mass index is 16.45 kg/m². Based upon direct observation of the patient, evaluation of cognition reveals recent and remote memory intact. Skin: warm and dry, no rash or erythema  Head: normocephalic and atraumatic  Eyes: pupils equal, round, and reactive to light, extraocular eye movements intact, conjunctivae normal  ENT: tympanic membrane, external ear and ear canal normal bilaterally, nose without deformity, nasal mucosa and turbinates normal without polyps  Neck: supple and non-tender without mass, no thyromegaly or thyroid nodules, no cervical lymphadenopathy  Pulmonary/Chest: clear to auscultation bilaterally- no wheezes, rales or rhonchi, normal air movement, no respiratory distress  Cardiovascular: normal rate, regular rhythm, normal S1 and S2, no murmurs, rubs, clicks, or gallops, distal pulses intact, no carotid bruits  Abdomen: soft, non-tender, non-distended, normal bowel sounds, no masses or organomegaly  Extremities: no cyanosis, clubbing or edema  Musculoskeletal: normal range of motion, no joint swelling, deformity or tenderness  Neurologic: reflexes normal and symmetric, no cranial nerve deficit, gait, coordination and speech normal    Patient's complete Health Risk Assessment and screening values have been reviewed and are found in Flowsheets. The following problems were reviewed today and where indicated follow up appointments were made and/or referrals ordered. Positive Risk Factor Screenings with Interventions:     Cognitive: Words recalled: 2 Words Recalled  Total Score Interpretation: Positive Mini-Cog  Cognitive Impairment Interventions:  · None needed    General Health:  General  In general, how would you say your health is?: Good  In the past 7 days, have you experienced any of the following?  New or Increased Pain, New or Increased Fatigue, Loneliness, Social Isolation, Stress or Anger?: None of These  Do you get the social and emotional support that you need?: Yes  Do you have a Living Will?: (!) No  General Health Risk Interventions:  · Info given  in AVS    Health Habits/Nutrition:  Health Habits/Nutrition  Do you exercise for at least 20 minutes 2-3 times per week?: (!) No  Have you lost any weight without trying in the past 3 months?: No  Do you eat fewer than 2 meals per day?: No  Have you seen a dentist within the past year?: (!) No  Body mass index is 16.45 kg/m². Health Habits/Nutrition Interventions:  Info given  in AVS    Hearing/Vision:  Hearing/Vision  Do you or your family notice any trouble with your hearing?: (!) Yes  Do you have difficulty driving, watching TV, or doing any of your daily activities because of your eyesight?: No  Have you had an eye exam within the past year?: Yes  Hearing/Vision Interventions:  Info given  in AVS    ADL:  ADLs  In the past 7 days, did you need help from others to take care of any of the following?  Laundry, housekeeping, banking/finances, shopping, telephone use, food preparation, transportation, or taking medications?: (!) Transportation, Food Preparation, Housekeeping  ADL Interventions:  Info given  in AVS    Personalized Preventive Plan   Current Health Maintenance Status  Immunization History   Administered Date(s) Administered    Influenza Vaccine, unspecified formulation 10/25/2010, 10/13/2011, 10/17/2012    Influenza Virus Vaccine 12/08/2000, 11/01/2001, 11/01/2002, 11/01/2006, 10/25/2010, 10/13/2011, 10/17/2012    Influenza, High Dose (Fluzone 65 yrs and older) 10/11/2013, 10/01/2014, 10/17/2017, 10/03/2018    Pneumococcal 13-valent Conjugate (Hgqqcaz38) 12/27/2017    Pneumococcal Polysaccharide ([id number]) 10/17/2012    Tdap (Boostrix, Adacel) 01/08/2019        Health Maintenance   Topic Date Due    Shingles Vaccine (1 of 2) 04/01/1981    DTaP/Tdap/Td vaccine (2 - Td) 01/08/2029    Flu vaccine  Completed    Pneumococcal 65+ years Vaccine  Completed     Recommendations for Preventive Services Due: see orders and patient instructions/AVS.  . Recommended screening schedule for the next 5-10 years is provided to the patient in written form: see Patient Instructions/AVS.         Diagnosis Orders   1. Routine general medical examination at a health care facility     2. Vitamin D insufficiency  Vitamin D 25 Hydroxy   3. Hyperkalemia  POTASSIUM     Doug Cui was seen today for medicare awv. Diagnoses and all orders for this visit:    Routine general medical examination at a health care facility    Vitamin D insufficiency  -     Vitamin D 25 Hydroxy; Future    Hyperkalemia  -     POTASSIUM;  Future

## 2019-04-23 ENCOUNTER — OFFICE VISIT (OUTPATIENT)
Dept: DERMATOLOGY | Age: 84
End: 2019-04-23
Payer: MEDICARE

## 2019-04-23 DIAGNOSIS — L57.0 ACTINIC KERATOSES: Primary | ICD-10-CM

## 2019-04-23 DIAGNOSIS — L82.1 SEBORRHEIC KERATOSIS: ICD-10-CM

## 2019-04-23 DIAGNOSIS — D48.9 NEOPLASM OF UNCERTAIN BEHAVIOR: ICD-10-CM

## 2019-04-23 PROCEDURE — 1123F ACP DISCUSS/DSCN MKR DOCD: CPT | Performed by: DERMATOLOGY

## 2019-04-23 PROCEDURE — G8427 DOCREV CUR MEDS BY ELIG CLIN: HCPCS | Performed by: DERMATOLOGY

## 2019-04-23 PROCEDURE — 1036F TOBACCO NON-USER: CPT | Performed by: DERMATOLOGY

## 2019-04-23 PROCEDURE — 17000 DESTRUCT PREMALG LESION: CPT | Performed by: DERMATOLOGY

## 2019-04-23 PROCEDURE — 99212 OFFICE O/P EST SF 10 MIN: CPT | Performed by: DERMATOLOGY

## 2019-04-23 PROCEDURE — 4040F PNEUMOC VAC/ADMIN/RCVD: CPT | Performed by: DERMATOLOGY

## 2019-04-23 PROCEDURE — 17003 DESTRUCT PREMALG LES 2-14: CPT | Performed by: DERMATOLOGY

## 2019-04-23 PROCEDURE — 69100 BIOPSY OF EXTERNAL EAR: CPT | Performed by: DERMATOLOGY

## 2019-04-23 PROCEDURE — G8419 CALC BMI OUT NRM PARAM NOF/U: HCPCS | Performed by: DERMATOLOGY

## 2019-04-23 NOTE — PROGRESS NOTES
[facility]) Dermat[facility]  [facility] [address] [address]       Reyes Seaman  1931    80 y.o. male     Date of Visit: 2019    Chief Complaint:   Chief Complaint   Patient presents with    Skin Lesion     2 spots left ear     HX: AK        I was asked to see this patient by Dr. Joaquin ref. provider found. History of Present Illness: Reyes Seaman is a 80 y.o. male who presents with the chief complaint of follow up for the followin. Complains of one new thick irritated bump to his left ear first appeared over the last 2 months. 2. Also has 2 scaly irritated spots that he believes was treated with cryotherapy at last visit but are returning over the last month. History of actinic keratoses treated with cryotherapy at last visit. Patient tolerated treatment well. 3. States he has a brown \"blister\" to his left hand has been present for over one month. Denies any drainage, pain, bleeding, pruritus. Admits to sun exposure in youth without wearing sunscreen, hats, or protective clothing. Review of Systems:  Constitutional: Reports general sense of well-being   Skin: No new or changing moles, no history of keloids or hypertrophic scars, no interval of severe sunburns  Heme: No abnormal bruising or bleeding. Past Medical History, Family History, Surgical History, Medications and Allergies reviewed.     Past Skin Hx:  History of actinic keratoses    Patient denies past history of melanoma, NMSC, dysplastic nevi, or chronic skin rashes.     PFHx: melanoma in son      Family History   Problem Relation Age of Onset    Cancer Other         Melanoma     Other Neg Hx      Past Medical History:   Diagnosis Date    Current moderate episode of major depressive disorder without prior episode ([facility] [address] 10/3/2018    Hyperlipidemia 10/11/2013     Past Surgical History:   Procedure Laterality Date    BACK SURGERY         Allergies   Allergen Reactions    Diclofenac Rash    Tramadol Nausea And

## 2019-04-23 NOTE — PATIENT INSTRUCTIONS
fluid to drain out. If the blister does not bother you, no treatment is needed.  Do NOT peel off the top of the blister roof. It will act as a dressing on top of your wound. WOUND CARE:    You may shower or bathe as usual, but avoid scrubbing the areas that have been frozen.  Cleanse the site twice a day with mild soapy water, and then apply a thin film of white petrolatum (Vaseline©).  You do not need to cover the area, but can if you prefer.  Do NOT allow the site to become dry or crusted, or attempt to dry it out with rubbing alcohol or hydrogen peroxide.  Continue this regimen until the area is pink and healed. Depending on the size and location of your cryosurgery site, healing may take 2 to 4 weeks.  The area may continue to be pink for several weeks, and over the next few months may become darker or lighter than the surrounding skin. This may be a permanent change.

## 2019-04-25 LAB — DERMATOLOGY PATHOLOGY REPORT: NORMAL

## 2019-04-26 ENCOUNTER — TELEPHONE (OUTPATIENT)
Dept: DERMATOLOGY | Age: 84
End: 2019-04-26

## 2019-04-26 NOTE — TELEPHONE ENCOUNTER
----- Message from Ree Mejia DO sent at 4/25/2019  1:08 PM EDT -----  Benign, warty like growth. no further treatment needed.  Please notify pt of result(s)

## 2019-04-30 ENCOUNTER — OFFICE VISIT (OUTPATIENT)
Dept: FAMILY MEDICINE CLINIC | Age: 84
End: 2019-04-30
Payer: MEDICARE

## 2019-04-30 VITALS
HEIGHT: 67 IN | BODY MASS INDEX: 16.48 KG/M2 | DIASTOLIC BLOOD PRESSURE: 74 MMHG | SYSTOLIC BLOOD PRESSURE: 118 MMHG | OXYGEN SATURATION: 96 % | HEART RATE: 60 BPM | WEIGHT: 105 LBS

## 2019-04-30 DIAGNOSIS — R20.9 COLD HANDS AND FEET: ICD-10-CM

## 2019-04-30 DIAGNOSIS — G62.9 NEUROPATHY: Primary | ICD-10-CM

## 2019-04-30 DIAGNOSIS — R09.89 OTHER SPECIFIED SYMPTOMS AND SIGNS INVOLVING THE CIRCULATORY AND RESPIRATORY SYSTEMS: ICD-10-CM

## 2019-04-30 PROCEDURE — 1123F ACP DISCUSS/DSCN MKR DOCD: CPT | Performed by: FAMILY MEDICINE

## 2019-04-30 PROCEDURE — G8419 CALC BMI OUT NRM PARAM NOF/U: HCPCS | Performed by: FAMILY MEDICINE

## 2019-04-30 PROCEDURE — G8427 DOCREV CUR MEDS BY ELIG CLIN: HCPCS | Performed by: FAMILY MEDICINE

## 2019-04-30 PROCEDURE — 99214 OFFICE O/P EST MOD 30 MIN: CPT | Performed by: FAMILY MEDICINE

## 2019-04-30 PROCEDURE — 1036F TOBACCO NON-USER: CPT | Performed by: FAMILY MEDICINE

## 2019-04-30 PROCEDURE — 4040F PNEUMOC VAC/ADMIN/RCVD: CPT | Performed by: FAMILY MEDICINE

## 2019-04-30 RX ORDER — GABAPENTIN 100 MG/1
100 CAPSULE ORAL NIGHTLY
Qty: 30 CAPSULE | Refills: 0 | Status: SHIPPED | OUTPATIENT
Start: 2019-04-30 | End: 2019-06-07 | Stop reason: SDUPTHER

## 2019-04-30 ASSESSMENT — ENCOUNTER SYMPTOMS
NAUSEA: 0
SHORTNESS OF BREATH: 0
VOMITING: 0
CONSTIPATION: 0
EYE REDNESS: 0
DIARRHEA: 0
BACK PAIN: 1

## 2019-04-30 NOTE — LETTER
MEDICATION AGREEMENT     Isaac Gomez  [birth year]      For certain conditions, multiple classes of medications may be used to help better manage your symptoms, and to improve your ability to function at home, work and in social settings. However, these medications do have risks, which will be discussed with you, including addiction and dependency. The following prescribed medications need frequent monitoring and will require you to partner and assist in your healthcare. Medication  Dose, instructions and quantity as indicated on current prescription bottle Diagnosis/Reason(s) for Taking Category     gabapentin (NEURONTIN) 100 MG capsule                 f                 Benefits and goals of Controlled Substance Medications: There are two potential goals for your treatment: (1) decreased pain and suffering (2) improved daily life functions. There are many possible treatments for your chronic condition(s), and, in addition to controlled substance medications, we will try alternatives such as physical therapy, yoga, massage, home daily exercise, meditation, relaxation techniques, injections, chiropractic manipulations, surgery, cognitive therapy, hypnosis and many medications that are not habit-forming. Use of controlled substance medications may be helpful, but they are unlikely to resolve all of your symptoms or restore all function. Risks of Controlled Substance Medications:    Opioid pain medications: These medications can lead to problems such as addiction/dependence, sedation, lightheadedness/dizziness, memory issues, falls, constipation, nausea, or vomiting. They may also impair the ability to drive or operate machinery. Additionally, these medications may lower testosterone levels, leading to loss of bone strength, stamina and sex drive.   They may cause problems with breathing, sleep apnea and reduced coughing, which are especially dangerous for patients with lung disease. Overdose or dangerous interactions with alcohol and other medications may occur, leading to death. Hyperalgesia may develop, in which patients receiving opioids for the treatment of pain may actually become more sensitive to certain painful stimuli, and in some cases, experience pain from ordinarily non-painful stimuli. Women between the ages of 14-53 who could become pregnant should carefully weigh the risks and benefits of opioids with their physicians, as these medications increase the risk of pregnancy complications, including miscarriage,  delivery and stillbirth. It is also possible for babies to be born addicted to opioids. Opioid dependence withdrawal symptoms may include; feelings of uneasiness, increased pain, irritability, belly pain, diarrhea, sweats and goose-flesh. Benzodiazepines and non-benzodiazepine sleep medications: These medications can lead to problems such as addiction/dependence, sedation, fatigue, lightheadedness, dizziness, incoordination, falls, depression, hallucinations, and impaired judgment, memory and concentration. The ability to drive and operate machinery may also be affected. Abnormal sleep-related behaviors have been reported, including sleep walking, driving, making telephone calls, eating, or having sex while not fully awake. These medications can suppress breathing and worsen sleep apnea, particularly when combined with alcohol or other sedating medications, potentially leading to death. Dependence withdrawal symptoms may include tremors, anxiety, hallucinations and seizures. Stimulants:  Common adverse effects include addiction/dependence, increased blood pressure and heart rate, decreased appetite, nausea, involuntary weight loss, insomnia, irritability, and headaches.   These risks may increase when these medications are combined with other stimulants, such as caffeine pills or energy drinks, certain weight loss supplements and oral decongestants. Dependence withdrawal symptoms may include depressed mood, loss of interest, suicidal thoughts, anxiety, fatigue, appetite changes and agitation. Testosterone replacement therapy:  Potential side effects include increased risk of stroke and heart attack, blood clots, increased blood pressure, increased cholesterol, enlarged prostate, sleep apnea, irritability/aggression and other mood disorders, and decreased fertility. Other:     1. I understand that I have the following responsibilities:  · I will take medications at the dose and frequency prescribed. · I will not increase or change how I take my medications without the approval of the health care provider who signs this Medication Agreement. · I will arrange for refills at the prescribed interval ONLY during regular office hours. I will not ask for refills earlier than agreed, after-hours, on holidays or on weekends. · I will obtain all refills for these medications at  ·  [facility] [address] [facility] [address] [phone] - F [phone]___________________________________  pharmacy (phone number  ·  ________________________), with full consent for my provider and pharmacist to exchange information in writing or verbally. · I will not request any pain medications or controlled substances from other providers and will inform this provider of all other medications I am taking. · I will inform my other health care providers that I am taking these medications and of the existence of this [facility]. In the event of an emergency, I will provide the same information to the emergency department providers. · I will protect my prescriptions and medications. I understand that lost or misplaced prescriptions will not be replaced. · I will keep medications only for my own use and will not share them with others. I will keep all medications away from children. · I agree to participate in any medical, psychological or psychiatric assessments recommended by my provider. · I will actively participate in any program designed to improve function, including social, physical, psychological and daily or work activities. 2. I will not use illegal or street drugs or another person's prescription. If I have an addiction problem with drugs or alcohol and my provider asks me to enter a program to address this issue, I agree to follow through. Such programs may include:  · 12-Step program and securing a sponsor  · Individual counseling   · Inpatient or outpatient treatment  · Other:_____________________________________________________________________________________________________________________________________________    If in treatment, I will request that a copy of the programs initial evaluation and treatment recommendations be sent to this provider and will not expect refills until that is received. I will also request written monthly updates be sent to this provider to verify my continuing treatment. 3. I will consent to drug screening upon my providers request to assure I am only taking the prescribed drugs, described in this MEDICATION AGREEMENT. I understand that a drug screen is a laboratory test in which a sample of my urine, blood or saliva is checked to see what drugs I have been taking. 4. I agree that I will treat the providers and staff at this office with respect at all times. I will keep all of my scheduled appointments, but if I need to cancel my appointment, I will do so a minimum of 24 hours before it is scheduled. 5. I understand that this provider may stop prescribing the medications listed if:  · I do not show any improvement in pain, or my activity has not improved. · I develop rapid tolerance or loss of improvement, as described in my treatment plan. · I develop significant side effects from the medication. · My behavior is inconsistent with the responsibilities outlined above, which may also result in my being prevented from receiving further care from this office. · Other:____________________________________________________________________    AGREEMENT:    I have read the above and have had all of my questions answered. For chronic disease management, I know that my symptoms can be managed with many types of treatments. A chronic medication trial may be part of my treatment, but I must be an active participant in my care. Medication therapy is only one part of my symptom management plan. In some cases, there may be limited scientific evidence to support the chronic use of certain medications to improve symptoms and daily function. Furthermore, in certain circumstances, there may be scientific information that suggests that use of chronic controlled substances may actually worsen my symptoms and increase my risk of unintentional death directly related to this medication therapy. I know that if my provider feels my risk from controlled medications is greater than my benefit, I will have my controlled substance medication(s) compassionately lowered or removed altogether. I agree to a controlled substance medication trial.      I further agree to allow this office to contact my HIPAA contact on file if there are concerns about my safety and use of controlled medications. I have agreed to use the following medications above as instructed by my physician and as stated in this [address].      Patient Signature:  ______________________  Date:4/30/2019 or _____________    Provider Signature:______________________  Date:4/30/2019 or _____________

## 2019-04-30 NOTE — PROGRESS NOTES
04/30/19      Albertorosario FalconKeanu  See HPI for CC    HPI  Here for \"legs dont feel right\"  Here with daughter who helps with hx     States they are tingling, for months, feeling cold all the time in his legs, and his hands are cold all the time too. Worse when he is sleeping at night and wakes in morning. Has been getting worse over past several weeks  Wears sox at night no help  He has bulging lumbar discs and compressure fracture in lumbar spine as  Well as osteroporosis and depression. Does not have any claudication symptoms when walking, but admits he does not walk very far or exercise. He did walk to [facility] where he lives with daughter and he fell down. Patient Active Problem List   Diagnosis    Hyperlipidemia    Osteoporosis    Anemia:controlled    Compression fracture of L2 ([facility] [address]    Thoracic vertebral fracture (HCC)    Lumbar compression fracture (HCC)    L4-L5 disc bulge    Bulging lumbar disc: L3-4 mild, L4-5 mild, L5-S1 mild    Grieving    Current moderate episode of major depressive disorder without prior episode [facility])       Past Medical History:   Diagnosis Date    Current moderate episode of major depressive disorder without prior episode ([facility] [address] 10/3/2018    Hyperlipidemia 10/11/2013     Social History     Socioeconomic History    Marital status:       Spouse name: Not on file    Number of children: Not on file    Years of education: Not on file    Highest education level: Not on file   Occupational History    Not on file   Social Needs    Financial resource strain: Not on file    Food insecurity:     Worry: Not on file     Inability: Not on file    Transportation needs:     Medical: Not on file     Non-medical: Not on file   Tobacco Use    Smoking status: Former Smoker    Smokeless tobacco: Never Used   Substance and Sexual Activity    Alcohol use: No    Drug use: Not on file    Sexual activity: Not Currently   Lifestyle    Physical activity:     Days per week: Not on file Minutes per session: Not on file    Stress: Not on file   Relationships    Social connections:     Talks on phone: Not on file     Gets together: Not on file     Attends Muslim service: Not on file     Active member of club or organization: Not on file     Attends meetings of clubs or organizations: Not on file     Relationship status: Not on file    Intimate partner violence:     Fear of current or ex partner: Not on file     Emotionally abused: Not on file     Physically abused: Not on file     Forced sexual activity: Not on file   Other Topics Concern    Not on file   Social History Narrative    Not on file           Review of Systems   Constitutional: Negative for unexpected weight change. HENT: Negative. Eyes: Negative for redness. Respiratory: Negative for shortness of breath. Cardiovascular: Negative for chest pain and leg swelling. Gastrointestinal: Negative for constipation, diarrhea, nausea and vomiting. Endocrine: Positive for cold intolerance. In hands and feet   Musculoskeletal: Positive for back pain. Negative for gait problem. Skin: Negative for pallor. Allergic/Immunologic: Negative for immunocompromised state. Neurological:        Tingling   Psychiatric/Behavioral: Negative for confusion. All other systems reviewed and are negative. Current Outpatient Medications   Medication Sig Dispense Refill    gabapentin (NEURONTIN) 100 MG capsule Take 1 capsule by mouth nightly for 180 days. Intended supply: 30 days 30 capsule 0    atorvastatin (LIPITOR) 10 MG tablet TAKE ONE TABLET BY MOUTH DAILY 90 tablet 3    BETIMOL 0.5 % ophthalmic solution       latanoprost (XALATAN) 0.005 % ophthalmic solution       sertraline (ZOLOFT) 25 MG tablet Take 1 tablet by mouth daily 30 tablet 5     No current facility-administered medications for this visit.         Vitals:    04/30/19 1208   BP: 118/74   Pulse: 60   SpO2: 96%         Physical Exam  Physical Exam Constitutional: He is oriented to person, place, and time. He appears well-developed and well-nourished. No distress. HENT:   Head: Normocephalic and atraumatic. Eyes: Conjunctivae are normal.   Cardiovascular: Normal rate, regular rhythm and normal heart sounds. Pulmonary/Chest: Effort normal and breath sounds normal. No stridor. He has no wheezes. Musculoskeletal: He exhibits no edema. Capillary refill brisk but does have cold feet, pulses difficult to palpate   Neurological: He is alert and oriented to person, place, and time. Coordination normal.   Skin: Skin is warm and dry. He is not diaphoretic. No pallor. Psychiatric: He has a normal mood and affect. His behavior is normal. Judgment and thought content normal.           Diagnosis Orders   1. Neuropathy  VL DUP LOWER EXTREMITY ARTERIES BILATERAL    gabapentin (NEURONTIN) 100 MG capsule    Drug Panel-PM-HI Res-UR Interp-A    Drug Panel-PM-HI Res-UR Interp-A   2. Cold hands and feet  VL DUP LOWER EXTREMITY ARTERIES BILATERAL   3. Other specified symptoms and signs involving the circulatory and respiratory systems   VL DUP LOWER EXTREMITY ARTERIES BILATERAL     Kalyani Pagan was seen today for numbness. Diagnoses and all orders for this visit:    Neuropathy  -     VL DUP LOWER EXTREMITY ARTERIES BILATERAL; Future  -     gabapentin (NEURONTIN) 100 MG capsule; Take 1 capsule by mouth nightly for 180 days. Intended supply: 30 days  -     Drug Panel-PM-HI Res-UR Interp-A; Future  -     Drug Panel-PM-HI Res-UR Interp-A  Discussed pros and cons of gabapentin and EMG testing, he and daughter want to go ahead and try the medication first. Discussed because of hx of depression, gabapenting can make it worse. They understand and feel his depression is much improved, wife recently  but he lives with daughter and is doing better. discussed any worsening to stop gabapentin and let me know  Cold hands and feet (Other specified symptoms and signs involving the circulatory and respiratory systems)   -     VL DUP LOWER EXTREMITY ARTERIES BILATERAL; Future              An electronic signature was used to authenticate this note. This was dictated. Errors mightbe possible due to dictation.   --Franklin Spencer, DO

## 2019-05-02 ENCOUNTER — HOSPITAL ENCOUNTER (OUTPATIENT)
Dept: VASCULAR LAB | Age: 84
Discharge: HOME | End: 2019-05-02
Payer: MEDICARE

## 2019-05-02 DIAGNOSIS — R20.9 COLD HANDS AND FEET: ICD-10-CM

## 2019-05-02 DIAGNOSIS — R09.89 OTHER SPECIFIED SYMPTOMS AND SIGNS INVOLVING THE CIRCULATORY AND RESPIRATORY SYSTEMS: ICD-10-CM

## 2019-05-02 DIAGNOSIS — G62.9 NEUROPATHY: ICD-10-CM

## 2019-05-02 DIAGNOSIS — I77.1 ARTERIAL INSUFFICIENCY (HCC): Primary | ICD-10-CM

## 2019-05-02 PROCEDURE — 93923 UPR/LXTR ART STDY 3+ LVLS: CPT

## 2019-05-03 LAB
6-ACETYLMORPHINE: NOT DETECTED
7-AMINOCLONAZEPAM: NOT DETECTED
ALPHA-OH-ALPRAZOLAM: NOT DETECTED
ALPRAZOLAM: NOT DETECTED
AMPHETAMINE: NOT DETECTED
BARBITURATES: NOT DETECTED
BENZOYLECGONINE: NOT DETECTED
BUPRENORPHINE: NOT DETECTED
CARISOPRODOL: NOT DETECTED
CLONAZEPAM: NOT DETECTED
CODEINE: NOT DETECTED
CREATININE URINE: 131.7 MG/DL (ref 20–400)
DIAZEPAM: NOT DETECTED
DRUGS EXPECTED: NORMAL
EER PAIN MGT DRUG PANEL, HIGH RES/EMIT U: NORMAL
ETHYL GLUCURONIDE: NOT DETECTED
FENTANYL: NOT DETECTED
HYDROCODONE: NOT DETECTED
HYDROMORPHONE: NOT DETECTED
LORAZEPAM: NOT DETECTED
MARIJUANA METABOLITE: NOT DETECTED
MDA: NOT DETECTED
MDEA: NOT DETECTED
MDMA URINE: NOT DETECTED
MEPERIDINE: NOT DETECTED
METHADONE: NOT DETECTED
METHAMPHETAMINE: NOT DETECTED
METHYLPHENIDATE: NOT DETECTED
MIDAZOLAM: NOT DETECTED
MORPHINE: NOT DETECTED
NORBUPRENORPHINE, FREE: NOT DETECTED
NORDIAZEPAM: NOT DETECTED
NORFENTANYL: NOT DETECTED
NORHYDROCODONE, URINE: NOT DETECTED
NOROXYCODONE: NOT DETECTED
NOROXYMORPHONE, URINE: NOT DETECTED
OXAZEPAM: NOT DETECTED
OXYCODONE: NOT DETECTED
OXYMORPHONE: NOT DETECTED
PAIN MANAGEMENT DRUG PANEL: NORMAL
PAIN MANAGEMENT DRUG PANEL: NORMAL
PCP: NOT DETECTED
PHENTERMINE: NOT DETECTED
PROPOXYPHENE: NOT DETECTED
TAPENTADOL, URINE: NOT DETECTED
TAPENTADOL-O-SULFATE, URINE: NOT DETECTED
TEMAZEPAM: NOT DETECTED
TRAMADOL: NOT DETECTED
ZOLPIDEM: NOT DETECTED

## 2019-05-16 ENCOUNTER — OFFICE VISIT (OUTPATIENT)
Dept: VASCULAR SURGERY | Age: 84
End: 2019-05-16
Payer: MEDICARE

## 2019-05-16 VITALS
TEMPERATURE: 96.9 F | BODY MASS INDEX: 19.94 KG/M2 | SYSTOLIC BLOOD PRESSURE: 131 MMHG | WEIGHT: 105.6 LBS | HEART RATE: 86 BPM | DIASTOLIC BLOOD PRESSURE: 78 MMHG | HEIGHT: 61 IN

## 2019-05-16 DIAGNOSIS — I73.9 PAD (PERIPHERAL ARTERY DISEASE) (HCC): ICD-10-CM

## 2019-05-16 PROCEDURE — 4040F PNEUMOC VAC/ADMIN/RCVD: CPT | Performed by: SURGERY

## 2019-05-16 PROCEDURE — G8420 CALC BMI NORM PARAMETERS: HCPCS | Performed by: SURGERY

## 2019-05-16 PROCEDURE — 1123F ACP DISCUSS/DSCN MKR DOCD: CPT | Performed by: SURGERY

## 2019-05-16 PROCEDURE — G8427 DOCREV CUR MEDS BY ELIG CLIN: HCPCS | Performed by: SURGERY

## 2019-05-16 PROCEDURE — 99204 OFFICE O/P NEW MOD 45 MIN: CPT | Performed by: SURGERY

## 2019-05-16 PROCEDURE — 1036F TOBACCO NON-USER: CPT | Performed by: SURGERY

## 2019-05-16 RX ORDER — BRIMONIDINE TARTRATE 0.1 %
DROPS OPHTHALMIC (EYE)
Refills: 5 | COMMUNITY
Start: 2019-05-07

## 2019-05-16 NOTE — PROGRESS NOTES
[facility]  Sandra Sanuders MD, [facility], [address]    OUTPATIENT CONSULTATION          Date of Consultation:  5/16/019    PCP:  Pino Penn DO       Chief Complaint: abnormal ABIs    History of Present Illness: Yanna Stokes a very pleasant, elderly 80 y.o. male who presents today for evaluation of PAD. Is not complain of pain per se. He complains of cold feet. He denies rest pain or symptoms of claudication. His walking is somewhat altered by general decreasing mobility likely associated with age. He had a VL LOWER EXTREMITY ARTERIAL SEGMENTAL PRESSURES W PPG BILATERAL  5/2/2019,  which was significant for right SHOLA 0.86 and left SHOLA 1.03 and findings suggesting right calf arterial disease. I personally reviewed the images of the study. PastMedical History:  Past Medical History:   Diagnosis Date    Current moderate episode of major depressive disorder without prior episode ([facility] [address] 10/3/2018    Hyperlipidemia 10/11/2013       Past Surgical History:     Past Surgical History:   Procedure Laterality Date    BACK SURGERY         Home Medications:   Prior to Admission medications    Medication Sig Start Date End Date Taking? Authorizing Provider   ALPHAGAN P 0.1 % SOLN INT ONE GTT IN OU BID 5/7/19  Yes Historical Provider, MD   atorvastatin (LIPITOR) 10 MG tablet TAKE ONE TABLET BY MOUTH DAILY 3/13/19  Yes Pino Penn DO   BETIMOL 0.5 % ophthalmic solution  10/5/18  Yes Historical Provider, MD   latanoprost (XALATAN) 0.005 % ophthalmic solution  10/1/18  Yes Historical Provider, MD   gabapentin (NEURONTIN) 100 MG capsule Take 1 capsule by mouth 2 times daily as needed (pain) for up to 30 days.  Intended supply: 30 days 6/7/19 7/7/19  Pino Penn DO   sertraline (ZOLOFT) 25 MG tablet Take 1 tablet by mouth daily 10/3/18   Pino Penn DO        Allergies:  Diclofenac and Tramadol     Social History:    Social History     Socioeconomic History    Marital well-developed and well-nourished. No distress. Elderly and somewhat frail appearing. Neck: Normal range of motion. Neck supple. No JVD present. Cardiovascular: Normal rate, regular rhythm and normal heart sounds. Pulmonary/Chest: Effort normal and breath sounds normal. No stridor. No respiratory distress. He has no wheezes. He has no rales. Abdominal: Soft. Bowel sounds are normal. He exhibits no distension and no mass. There is no tenderness. There is no guarding. Musculoskeletal: Normal range of motion. Minimal edema. No deformities. Feet are cool but essentially normal in coloration with normal capillary refill. No ulcerations or cyanosis. Neurological: He is alert and oriented to person, place, and time. No cranial nerve deficit. Psychiatric: He has a normal mood and affect. His behavior is normal. Judgment and thought content normal.      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 No further evaluation or surgical intervention would be recommended or indicated. I will be happy to see him back on a when necessary basis.

## 2019-06-07 DIAGNOSIS — G62.9 NEUROPATHY: ICD-10-CM

## 2019-06-07 PROBLEM — I73.9 PAD (PERIPHERAL ARTERY DISEASE) (HCC): Status: ACTIVE | Noted: 2019-06-07

## 2019-06-07 RX ORDER — GABAPENTIN 100 MG/1
100 CAPSULE ORAL 2 TIMES DAILY PRN
Qty: 60 CAPSULE | Refills: 0 | Status: SHIPPED | OUTPATIENT
Start: 2019-06-07 | End: 2019-06-28

## 2019-06-07 NOTE — TELEPHONE ENCOUNTER
Last ov 04/30/019 No future appointments.    Per Steve Norbert (the Daughter) they are going out of town for the week and Alcon No has been in some pain and they would like to know if they can get something for pain called into the pharmacy for Ordarcya Been

## 2019-06-10 ENCOUNTER — TELEPHONE (OUTPATIENT)
Dept: FAMILY MEDICINE CLINIC | Age: 84
End: 2019-06-10

## 2019-06-10 DIAGNOSIS — S32.020S CLOSED COMPRESSION FRACTURE OF SECOND LUMBAR VERTEBRA, SEQUELA: ICD-10-CM

## 2019-06-10 DIAGNOSIS — M51.36 BULGING OF LUMBAR INTERVERTEBRAL DISC: Primary | ICD-10-CM

## 2019-06-10 NOTE — TELEPHONE ENCOUNTER
Patients daughter called about the patients back pain. She stated its getting worse. He is walking slower. She is not sure if he should get a MRI, does he need to come back in for an appointment. She does not know what the next step is?  Please advise

## 2019-06-11 ENCOUNTER — TELEPHONE (OUTPATIENT)
Dept: PAIN MANAGEMENT | Age: 84
End: 2019-06-11

## 2019-06-11 NOTE — TELEPHONE ENCOUNTER
He is on the lowest dose of gabapentin, so if he increases it, it will increase his pain control.    He should take 2 100mg tablets twice a day, up from 1 100mg tablet once a day

## 2019-06-11 NOTE — TELEPHONE ENCOUNTER
Did she get my previous message when she called back, if not give it to her. Both a CT and an MRI require stillness during the imaging. CT takes less time. Also, we already know he has bulging discs,(L3-4, L4-5, and L5-S1) As well as a compression fracture at L2 which cause pain. We can try to adjust his pain medicine, and if he is interested send him for a consult for possible injection to reduce pain and see how he does before ordering a CT because the results may not change his therapy.

## 2019-06-11 NOTE — TELEPHONE ENCOUNTER
[address]    Screening Questionnaire     Name of current [id number] [facility] PCP (per patient): Paola Herrera   Referring diagnosis: low back for an injection    1. Name of last Pain provider: No  2. When were you last seen by this Pain provider: NA  3. Last time you had MRI/XRays done for your pain: 2014 [facility]   Report available?: Yes  4. Are you taking any opioids at this time:  No   Name of medication: NA  5. Are you under opioid contract with your current provider:  No   Last date medication filled: NA  6. Reason for switch:    - Were you discharged?:  No   - Other Reason: NA     We need the last 3 office notes and MRI reports (within past 5 years), if any    available, before being seen    PLEASE READ FOLLOWING INFORMATION TO PATIENTS:     - We are a Comprehensive Pain Management program.   - Prior pain medications may be changed, based on our evaluation.   - We may require Behavioral Health consultation with Pain Psychologist (Dr Miles Sousa) at the time of initial evaluation. If done so, there may be a separate charge for the psychology services. Please allow additional 30 minutes to complete this evaluation.     - You need a CURRENT [facility] provider.    (check with the referring provider's office to confirm). IF OLD RECORDS (INCLUDING MRI REPORT) NOT RECEIVED WITHIN 2 WEEKS, WE MAY SEND REFERRAL BACK TO REFERRING PROVIDER. We ask that you bring your Photo ID, Insurance card and any co-payments that are due at the time of your services. Any unresolved questions, please refer to the Provider for approval.    Approved for Consult:   Yes

## 2019-06-11 NOTE — TELEPHONE ENCOUNTER
Pt daughter called back in and she said that the gabapentin is not helping is what he has told her. She is aware that you have ordered the MRI.

## 2019-06-11 NOTE — TELEPHONE ENCOUNTER
Daughter just called back and tried to schedule appointment for MRI and she was told to call back and get an order for a CT in that the pt will not be able to lay still for 45 minutes the test will be useless, but you would have to say that is what he would need. Please advise?

## 2019-06-11 NOTE — TELEPHONE ENCOUNTER
Pt did get previous message and she replied that the medication was still not helping and I also gave her the additional message regarding the CT and MRI and she the daughter understood. She states that she agrees she wants to do the consult for the injections moving forward to help him with the pain. Eloina Ndiaye

## 2019-06-11 NOTE — TELEPHONE ENCOUNTER
If she just got the message today about doubling his dose, how does she know it is not working? Did he already double it on his own? Has he not tried the increased dose yet? We will refer him for consultation for pain management and it is up to the physician there to determine if he is a candidate for injections. Give her the contact info. Referral in [facility].

## 2019-06-12 ENCOUNTER — HOSPITAL ENCOUNTER (OUTPATIENT)
Dept: GENERAL RADIOLOGY | Age: 84
Discharge: HOME | End: 2019-06-12
Payer: MEDICARE

## 2019-06-12 ENCOUNTER — OFFICE VISIT (OUTPATIENT)
Dept: PAIN MANAGEMENT | Age: 84
End: 2019-06-12
Payer: MEDICARE

## 2019-06-12 ENCOUNTER — HOSPITAL ENCOUNTER (OUTPATIENT)
Age: 84
Discharge: HOME | End: 2019-06-12
Payer: MEDICARE

## 2019-06-12 VITALS
WEIGHT: 105 LBS | HEART RATE: 98 BPM | SYSTOLIC BLOOD PRESSURE: 137 MMHG | DIASTOLIC BLOOD PRESSURE: 83 MMHG | BODY MASS INDEX: 16.88 KG/M2 | HEIGHT: 66 IN

## 2019-06-12 DIAGNOSIS — S22.000S CLOSED COMPRESSION FRACTURE OF THORACIC VERTEBRA, SEQUELA: Primary | ICD-10-CM

## 2019-06-12 DIAGNOSIS — S32.000A CLOSED COMPRESSION FRACTURE OF BODY OF LUMBAR VERTEBRA (HCC): ICD-10-CM

## 2019-06-12 DIAGNOSIS — R54 ADVANCED AGE: ICD-10-CM

## 2019-06-12 DIAGNOSIS — S22.000S CLOSED COMPRESSION FRACTURE OF THORACIC VERTEBRA, SEQUELA: ICD-10-CM

## 2019-06-12 DIAGNOSIS — G89.29 OTHER CHRONIC PAIN: ICD-10-CM

## 2019-06-12 PROCEDURE — 72070 X-RAY EXAM THORAC SPINE 2VWS: CPT

## 2019-06-12 PROCEDURE — G0444 DEPRESSION SCREEN ANNUAL: HCPCS | Performed by: ANESTHESIOLOGY

## 2019-06-12 PROCEDURE — 99204 OFFICE O/P NEW MOD 45 MIN: CPT | Performed by: ANESTHESIOLOGY

## 2019-06-12 PROCEDURE — 72100 X-RAY EXAM L-S SPINE 2/3 VWS: CPT

## 2019-06-12 RX ORDER — SENNA PLUS 8.6 MG/1
1 TABLET ORAL 2 TIMES DAILY
Qty: 14 TABLET | Refills: 0 | Status: SHIPPED | OUTPATIENT
Start: 2019-06-12 | End: 2019-06-28

## 2019-06-12 RX ORDER — HYDROCODONE BITARTRATE AND ACETAMINOPHEN 5; 325 MG/1; MG/1
1 TABLET ORAL 2 TIMES DAILY PRN
Qty: 5 TABLET | Refills: 0 | Status: SHIPPED | OUTPATIENT
Start: 2019-06-12 | End: 2019-06-14 | Stop reason: SDUPTHER

## 2019-06-12 ASSESSMENT — PATIENT HEALTH QUESTIONNAIRE - PHQ9
6. FEELING BAD ABOUT YOURSELF - OR THAT YOU ARE A FAILURE OR HAVE LET YOURSELF OR YOUR FAMILY DOWN: 3
SUM OF ALL RESPONSES TO PHQ9 QUESTIONS 1 & 2: 6
8. MOVING OR SPEAKING SO SLOWLY THAT OTHER PEOPLE COULD HAVE NOTICED. OR THE OPPOSITE, BEING SO FIGETY OR RESTLESS THAT YOU HAVE BEEN MOVING AROUND A LOT MORE THAN USUAL: 2
SUM OF ALL RESPONSES TO PHQ QUESTIONS 1-9: 18
9. THOUGHTS THAT YOU WOULD BE BETTER OFF DEAD, OR OF HURTING YOURSELF: 0
1. LITTLE INTEREST OR PLEASURE IN DOING THINGS: 3
SUM OF ALL RESPONSES TO PHQ QUESTIONS 1-9: 18
5. POOR APPETITE OR OVEREATING: 1
3. TROUBLE FALLING OR STAYING ASLEEP: 3
2. FEELING DOWN, DEPRESSED OR HOPELESS: 3
7. TROUBLE CONCENTRATING ON THINGS, SUCH AS READING THE NEWSPAPER OR WATCHING TELEVISION: 0
4. FEELING TIRED OR HAVING LITTLE ENERGY: 3
10. IF YOU CHECKED OFF ANY PROBLEMS, HOW DIFFICULT HAVE THESE PROBLEMS MADE IT FOR YOU TO DO YOUR WORK, TAKE CARE OF THINGS AT HOME, OR GET ALONG WITH OTHER PEOPLE: 3

## 2019-06-12 ASSESSMENT — ANXIETY QUESTIONNAIRES
6. BECOMING EASILY ANNOYED OR IRRITABLE: 2-OVER HALF THE DAYS
7. FEELING AFRAID AS IF SOMETHING AWFUL MIGHT HAPPEN: 1-SEVERAL DAYS
3. WORRYING TOO MUCH ABOUT DIFFERENT THINGS: 2-OVER HALF THE DAYS
2. NOT BEING ABLE TO STOP OR CONTROL WORRYING: 3-NEARLY EVERY DAY
5. BEING SO RESTLESS THAT IT IS HARD TO SIT STILL: 3-NEARLY EVERY DAY
GAD7 TOTAL SCORE: 17
4. TROUBLE RELAXING: 3-NEARLY EVERY DAY
1. FEELING NERVOUS, ANXIOUS, OR ON EDGE: 3-NEARLY EVERY DAY

## 2019-06-12 ASSESSMENT — ENCOUNTER SYMPTOMS
BACK PAIN: 1
ABDOMINAL PAIN: 0
WHEEZING: 0
EYE DISCHARGE: 0
SHORTNESS OF BREATH: 0
NAUSEA: 0
CONSTIPATION: 0
EYE REDNESS: 0
VOMITING: 0
EYE PAIN: 0
COUGH: 0

## 2019-06-12 NOTE — PATIENT INSTRUCTIONS
Patient Education     I have ordered XRays for the back. I have provided [facility] for pain - may take 1/2 tab twice a day as needed  I have also provided SENNA for constipation  Call us back after XRays for further options. Compression Fracture of the Spine: Care Instructions  Your Care Instructions    A compression fracture happens when the front part of a spinal bone breaks and collapses. A fall or other accident can cause it. A minor injury or moving the wrong way can cause a break if you have thin or brittle bones (osteoporosis). These types of breaks will heal in 8 to 10 weeks. You will need rest and pain medicines. Your doctor may recommend physical therapy. Some doctors recommend that certain people with compression fractures wear braces. Your doctor also may treat thin or brittle bones. You may need surgery if you have lasting pain or if the bone presses on the spinal cord or nerves. You heal best when you take good care of yourself. Eat a variety of healthy foods, and don't smoke. Follow-up care is a key part of your treatment and safety. Be sure to make and go to all appointments, and call your doctor if you are having problems. It's also a good idea to know your test results and keep a list of the medicines you take. How can you care for yourself at home? · Be safe with medicines. Read and follow all instructions on the label. ? If the doctor gave you a prescription medicine for pain, take it as prescribed. ? If you are not taking a prescription pain medicine, ask your doctor if you can take an over-the-counter medicine. · Talk to your doctor about how to make your bones stronger. You may need medicines or a change in what you eat. · Be active only as directed by your doctor. When should you call for help? Call 911 anytime you think you may need emergency care.  For example, call if:    · You are unable to move an arm or a leg at all.   [facility] your doctor now or seek immediate medical care

## 2019-06-12 NOTE — PROGRESS NOTES
[facility]  [address], [place], [address]  [phone] (I)  [phone] (f)      06/12/19      Marilyn Baer  4/1/1931      Pino Penn, DO  [address] [facility], [address] [place] [address] Road  [phone]      Reason for Referral:  \"Bulging of lumbar disc\"    Chief Complaint:   Chief Complaint   Patient presents with    Lower Back Pain     The patient complains of low back pain that radiates down both leg's. History of Present Illness   HPI    PATIENT HISTORY    Presents with chronic mid and low back pain for several decades and reports possible lumbar fusion surgery at least 20 years ago. He also has a history multiple osteoporotic compression fractures - T11 kyphoplasty in 11/2009 at [facility]. Since then multiple chronic subacute  compressions at T5, T7, T10, T12 vertebrae; managed conservatively with NSAIDs. Reports flare of mid/low back pain for the past week after getting out of bed. Denies weakness in legs or radiation from the back. Ongoing issues with constipation - but no new bladder/bowel changes. Referred by PCP to us for possible interventions. History significant for - HTN, osteoporosis, depression. 1. Pain Characteristics:    Location of Pain: low back pain  Quality of Pain: sharp achy  Frequency of Pain: constant episodically worse  Radiation: none  Tingling/numbness: none  Weakness: none  Aggravated by: changing position, bending climbing stairs  Relieved by: nothing. Other: as above. RED FLAG symptoms (Symptoms may include, but notlimited to, acute trauma, fever, drug use, malignancy, weakness, perianal numbness, bladder/bowel changes) - No     2. Therapies Tried:    Chiropractic Manipulation: No / N/A   Physical Therapy: No / N/A   Home Exercise: No / N/A   TENS Therapy: No / N/A   Psychotherapy: No / N/A   Injections: No / N/A   Surgery: None Recently / N/A   Other: No    3.  Pain Medications Tried:    NSAIDS: No / N/A Antidepressants/Anxiolytics: Yes / depression - sertraline   Anticonvulsants: Yes / N/A - gabapentin   Muscle Relaxants: No / N/A   Opioids: Yes / N/A - Norco PRN    4. Co-existing Conditions:    Past Medical History:   Diagnosis Date    Current moderate episode of major depressive disorder without prior episode ([facility] [address] 10/3/2018    Hyperlipidemia 10/11/2013       Past Surgical History:   Procedure Laterality Date    BACK SURGERY      OTHER SURGICAL HISTORY      ulcer rupture. Allergies   Allergen Reactions    Diclofenac Rash    Tramadol Nausea And Vomiting       Current Outpatient Medications   Medication Sig Dispense Refill    Elastic Bandages & Supports (LUMBAR BACK BRACE/SUPPORT PAD) MISC 1 each by Does not apply route daily as needed (Elastic lumbar soft brace) 1 each 0    HYDROcodone-acetaminophen (NORCO) 5-325 MG per tablet Take 1 tablet by mouth 2 times daily as needed (severe pain) for up to 5 days. May take 1/2 to 1 pill twice a day, as needed. 5 tablet 0    senna (SENOKOT) 8.6 MG tablet Take 1 tablet by mouth 2 times daily for 7 days 14 tablet 0    gabapentin (NEURONTIN) 100 MG capsule Take 1 capsule by mouth 2 times daily as needed (pain) for up to 30 days. Intended supply: 30 days 60 capsule 0    ALPHAGAN P 0.1 % SOLN INT ONE GTT IN OU BID  5    atorvastatin (LIPITOR) 10 MG tablet TAKE ONE TABLET BY MOUTH DAILY 90 tablet 3    BETIMOL 0.5 % ophthalmic solution       latanoprost (XALATAN) 0.005 % ophthalmic solution        No current facility-administered medications for this visit. Family History   Problem Relation Age of Onset    Cancer Other         Melanoma     Other Neg Hx        Social History     Socioeconomic History    Marital status:       Spouse name: Not on file    Number of children: Not on file    Years of education: Not on file    Highest education level: Not on file   Occupational History    Not on file   Social Needs    Financial resource strain: Not on file    Food insecurity:     Worry: Not on file     Inability: Not on file    Transportation needs:     Medical: Not on file     Non-medical: Not on file   Tobacco Use    Smoking status: Former Smoker     Last attempt to quit: 2000     Years since quittin.0    Smokeless tobacco: Never Used   Substance and Sexual Activity    Alcohol use: No    Drug use: Never    Sexual activity: Not Currently   Lifestyle    Physical activity:     Days per week: Not on file     Minutes per session: Not on file    Stress: Not on file   Relationships    Social connections:     Talks on phone: Not on file     Gets together: Not on file     Attends [employer] service: Not on file     Active member of club or organization: Not on file     Attends meetings of clubs or organizations: Not on file     Relationship status: Not on file    Intimate partner violence:     Fear of current or ex partner: Not on file     Emotionally abused: Not on file     Physically abused: Not on file     Forced sexual activity: Not on file   Other Topics Concern    Not on file   Social History Narrative    Not on file       Occupation: NA  Currently Employed: No  Any pendinglawsuits for pain: No  Disability: No    Substance Use History:  History of Substance Abuse: No   Ongoing: No    5. Imaging Studies:   VL Lower extremities (2019)  \"Summary        1. There is moderate arterial insufficiency at rest in the right lower    extremity. Pressure and waveform analysis suggest calf vessel and infra    malleolar arterial disease of the right lower extremity.    2. There is no arterial insufficiency at rest in the left lower extremity.    Pressure and waveform analysis suggest infra malleolar arterial disease. \"     MRI LS (2009)  \"IMPRESSION:  1. NO NEURAL COMPRESSION, CENTRAL STENOSIS, OR NEURAL FORAMEN STENOSIS.   2. ACUTE T11 VERTEBRAL BODY SUPERIOR ENDPLATE COMPRESSION FRACTURE WITH 50% HEIGHT LOSS AND NO RETROPULSION OF THE SUPERIOR ENDPLATE AT PERIOD OF EDEMA PATTERN WITHIN THE VERTEBRAL BODY IS MOST SUGGESTIVE OF A BENIGN OSTEOPOROTIC FRACTURE. THIS COMPRESSION FRACTURE WOULD BE AMENABLE TO KYPHOPLASTY IF CLINICALLY INDICATED. 3. EXAGGERATED THORACIC KYPHOSIS ASSOCIATED WITH ADDITIONAL MULTILEVEL CHRONIC VERTEBRAL BODY COMPRESSION FRACTURES INCLUDING T5, T7, T9, T10 AND T12.  4. CLINICAL SIGNIFICANCE OF SMALL AMOUNT OF EDEMA SIGNAL ADJACENT TO A T12 INFERIOR ENDPLATE SCHMORL'S NODE DEFORMITY IS UNKNOWN. \"    XR TS (12/24/2017) [facility] -  \"IMPRESSION:  1. Previous vertebral augmentation procedure lower thoracic vertebral body. 2. Multiple anterior wedge compressions mid and lower thoracic spine and mid and lower lumbar spine. Age is indeterminant as there are no previous films at [place] [id number]. 3. Right hilar fullness. This could be vascular or mass lesion. \"    Results of the above studies were personally reviewed by me with the patient in detail along with the images, if available. [facility] regarding other unrelated findings not addressed during today's visit. PHYSICALEXAMINATION    1. Review of Systems:   Review of Systems   Constitutional: Negative for chills and fever. HENT: Negative for hearing loss and tinnitus. Eyes: Negative for pain, discharge and redness. Respiratory: Negative for cough, shortness of breath and wheezing. Cardiovascular: Negative for chest pain and palpitations. Gastrointestinal: Negative for abdominal pain, constipation, nausea and vomiting. Genitourinary: Negative for frequency, hematuria and urgency. Musculoskeletal: Positive for back pain. Negative for myalgias and neck pain. Skin: Negative for rash. Neurological: Negative for dizziness, seizures, weakness and headaches. Hematological: Does not bruise/bleed easily. Psychiatric/Behavioral: Negative for suicidal ideas. The patient is not nervous/anxious.         The patient was instructed to contact primary care physician regarding ROS positives not being addressed during today's visit. 2. Physical Exam:   Physical Exam   Constitutional: He is oriented to person, place, and time. No distress. No acute distress, ambulates without help  Accompanied by daughter, who is the caregiver. HENT:   Head: Normocephalic. Eyes: Pupils are equal, round, and reactive to light. EOM are normal.   Neck: Normal range of motion. Neck supple. No thyromegaly present. Cardiovascular: Normal rate, regular rhythm, normal heart sounds and intact distal pulses. Pulmonary/Chest: Effort normal and breath sounds normal. No respiratory distress. He exhibits no tenderness. Abdominal: Soft. Bowel sounds are normal. He exhibits no mass. There is no tenderness. There is no guarding. Musculoskeletal: Normal range of motion. He exhibits no tenderness or deformity. Kyphoscoliosis of thoracolumbar spine. No midline or paraspinal tenderness in back   Lymphadenopathy:     He has no cervical adenopathy. Neurological: He is alert and oriented to person, place, and time. He has normal strength and normal reflexes. He displays normal reflexes. No cranial nerve deficit or sensory deficit. He exhibits normal muscle tone. Gait (compensated) abnormal. Coordination normal. He displays no Babinski's sign on the right side. He displays no Babinski's sign on the left side. Reflex Scores:       Tricep reflexes are 2+ on the right side and 2+ on the left side. Bicep reflexes are 2+ on the right side and 2+ on the left side. Brachioradialis reflexes are 2+ on the right side and 2+ on the left side. Patellar reflexes are 2+ on the right side and 2+ on the left side. Achilles reflexes are 2+ on the right side and 2+ on the left side. Skin: Skin is warm. No rash noted. He is not diaphoretic. Psychiatric: He has a normal mood and affect.  His behavior is normal. Thought content normal. Vitals:    06/12/19 1005   BP: 137/83   Site: Right Upper Arm   Position: Sitting   Cuff Size: Medium Adult   Pulse: 98   Weight: 105 lb (47.6 kg)   Height: 5' 6\" (1.676 m)       Body mass index is 16.95 kg/m². Pain Score:  10 - Worst pain ever /10    3. Functional Assessment:     Brief Pain Inventory (BPI)   Pain Score = 10/10   Interference Score = 8 /10    Pain, Enjoyment, General Activity (PEG-3)    Score = 9 /10    Pain Disability Index (PDI)    Score = 40 /70      Patient Health Questionnaire-9 (PHQ-9)    Score = 18 /27      General Anxiety Disorder-7 (JESSI-7)    Score = 17 /21     Pain Catastrophizing Scale (PCS)    Score = 47 /52    ASSESSMENT    1. Closed compression fracture of thoracic vertebra, sequela  Chronic  - Elastic Bandages & Supports (LUMBAR BACK BRACE/SUPPORT PAD) MISC; 1 each by Does not apply route daily as needed (Elastic lumbar soft brace)  Dispense: 1 each; Refill: 0  - HYDROcodone-acetaminophen (NORCO) 5-325 MG per tablet; Take 1 tablet by mouth 2 times daily as needed (severe pain) for up to 5 days. May take 1/2 to 1 pill twice a day, as needed. Dispense: 5 tablet; Refill: 0  - XR THORACIC SPINE (2 VIEWS); Future    2. Closed compression fracture of body of lumbar vertebra (HCC)  Chronic  - XR LUMBAR SPINE (2-3 VIEWS); Future  - Elastic Bandages & Supports (LUMBAR BACK BRACE/SUPPORT PAD) MISC; 1 each by Does not apply route daily as needed (Elastic lumbar soft brace)  Dispense: 1 each; Refill: 0  - HYDROcodone-acetaminophen (NORCO) 5-325 MG per tablet; Take 1 tablet by mouth 2 times daily as needed (severe pain) for up to 5 days. May take 1/2 to 1 pill twice a day, as needed. Dispense: 5 tablet; Refill: 0    3. Other chronic pain  Chronic  - HYDROcodone-acetaminophen (NORCO) 5-325 MG per tablet; Take 1 tablet by mouth 2 times daily as needed (severe pain) for up to 5 days. May take 1/2 to 1 pill twice a day, as needed. Dispense: 5 tablet;  Refill: 0  - senna (SENOKOT) 8.6 MG tablet; Take 1 tablet by mouth 2 times daily for 7 days  Dispense: 14 tablet; Refill: 0    4. Advanced age        TREATMENT PLAN    1. Goals:     Multidisciplinary comprehensive pain management with functional improvement. 2. Plan of Care Recommendations:     Chronic back pain due to multiple vertebral compression fractures. Recent flare of mid/low back pain; physical exam showed no tenderness or focal signs. MRI lumbar spine was ordered by PCP, patient unwilling - may get XR to evaluate for new fractures. Reports he was just placed on gabapentin without help; ibuprofen not helping enough. Counseled on opioid; will provide Norco 5 mg PRN for severe pain - will add senna to prevent constipation. He absolutely declined surgical options. Patient lives in the second floor of caregiver's (Daughter) home, who is trying to get him comfortable before she could leave on a business trip next week. Encouraged her to call us back, if Norco does not help; will await XR studies. Additional imaging/referrals: XR spine    Physical therapy/Home exercises: back brace    Psychological: NA    Interventions: TBD    Adjuvant medications: ibuprofen    Prescription pain medications: Norco 5 mg 1/2 tab BID PRN      Current MEDD = 0    ORT (Opioid Risk Tool) Score = 1 LOW    Other Risk factors - depression, anxiety, advanced age. UDT (Urine Drug Test) - NA    Pain Medication Agreement - NA      Controlled Substances Monitoring:   Periodic Controlled Substance Monitoring: Possible medication side effects, risk of tolerance/dependence & alternative treatments discussed., No signs of potential drug abuse or diversion identified. Bk Ochoa MD)    - Reviewed [place]ARRS report in detail, including Narx Scores and OverdoseRisk Score. - Counseled on dual effects, limitations, side effects and long-term complications of opioids including but not limited to opioid induced constipation, depression of breathing, sleep disorders.

## 2019-06-14 ENCOUNTER — TELEPHONE (OUTPATIENT)
Dept: FAMILY MEDICINE CLINIC | Age: 84
End: 2019-06-14

## 2019-06-14 ENCOUNTER — TELEPHONE (OUTPATIENT)
Dept: PAIN MANAGEMENT | Age: 84
End: 2019-06-14

## 2019-06-14 DIAGNOSIS — S32.000A CLOSED COMPRESSION FRACTURE OF BODY OF LUMBAR VERTEBRA (HCC): ICD-10-CM

## 2019-06-14 DIAGNOSIS — S32.000A CLOSED COMPRESSION FRACTURE OF BODY OF LUMBAR VERTEBRA (HCC): Primary | ICD-10-CM

## 2019-06-14 DIAGNOSIS — G89.29 OTHER CHRONIC PAIN: ICD-10-CM

## 2019-06-14 DIAGNOSIS — S22.000S CLOSED COMPRESSION FRACTURE OF THORACIC VERTEBRA, SEQUELA: ICD-10-CM

## 2019-06-14 RX ORDER — HYDROCODONE BITARTRATE AND ACETAMINOPHEN 5; 325 MG/1; MG/1
1 TABLET ORAL 2 TIMES DAILY PRN
Qty: 10 TABLET | Refills: 0 | Status: SHIPPED | OUTPATIENT
Start: 2019-06-14 | End: 2019-06-19

## 2019-06-14 NOTE — TELEPHONE ENCOUNTER
MEDICATION ISSUES     Reported Issue:  Medication Refill    Medication Information     - Refill medication requested: Hydrocodone     - Medication dosage and quantity: 5/325 mg # 5     - Reporting side effects, if any: No     - Other issues, if any: I SPOKE WITH THE DAUGHTER ABOUT PRN. Controlled Substances     - Last refill date (per OARRS): NA      - Follow-up Appointment date: [address] updated with patient: Yes     - PHARMACY updated in Chart: Yes      NOTE for Controlled Substances     PLEASE READ TO PATIENTS:    1. For routine refills: ALL MEDICATION REFILLS NEED 2 WORKING DAYS (48-HOUR) ADVANCED NOTICE - Not filled on weekends or holidays.     - Patient informed about the above policy:  NA    2. If switching or changing opioid pain medications -    PATIENTS NEED TO BRING OLD MEDICATION FOR PILL COUNT AND POSSIBLE DESTRUCTION - before new medication could be filled.      - Patient informed about the above policy:  NA

## 2019-06-14 NOTE — TELEPHONE ENCOUNTER
Pt called states that Dr. Galan Slot specialist for his back took him off his Gabapentin and put him on Hydrocodone and gave him Senna, took x-rays and is waiting on the results, she also had me cancel his upcoming appointment unless he is needed to come back in.

## 2019-06-14 NOTE — TELEPHONE ENCOUNTER
Patient's daughter Lupillo Lopez states that pt doesn't think the back brace is going to work, and the pain meds just take the edge off. He wants to have the MRI done at [facility]. Lupillo Lopez is going out of town on 6/21/19 and patient really wants to get the MRI done before she leaves.

## 2019-06-14 NOTE — TELEPHONE ENCOUNTER
MEDS: Hydrocodone    PHARMACY: Verified    F/U APPT DATE: n/a      Patient's daughter said he is tolerating the medicine very well. He's taking the Hydrocodone as prescribed, and Ibuprofen in between doses,  She said he has 2.5 pills left. I explained that script will have to be picked up at the office. She was upset because she lives so far away, and wants to know if script can be sent to the pharmacy.

## 2019-06-25 ENCOUNTER — TELEPHONE (OUTPATIENT)
Dept: PHARMACY | Facility: CLINIC | Age: 84
End: 2019-06-25

## 2019-06-25 ENCOUNTER — TELEPHONE (OUTPATIENT)
Dept: PAIN MANAGEMENT | Age: 84
End: 2019-06-25

## 2019-06-25 ENCOUNTER — HOSPITAL ENCOUNTER (OUTPATIENT)
Dept: MRI IMAGING | Age: 84
Discharge: HOME | End: 2019-06-25
Payer: MEDICARE

## 2019-06-25 DIAGNOSIS — S32.000A CLOSED COMPRESSION FRACTURE OF BODY OF LUMBAR VERTEBRA (HCC): ICD-10-CM

## 2019-06-25 PROCEDURE — 72148 MRI LUMBAR SPINE W/O DYE: CPT

## 2019-06-25 NOTE — TELEPHONE ENCOUNTER
CLINICAL PHARMACY CONSULT: MED RECONCILIATION/REVIEW ADDENDUM    For Pharmacy Admin Tracking Only    PHSO: Yes  Total # of Interventions Recommended: 1  - New Order #: 0 New Medication Order Reason(s):  Adherence  - Maintenance Safety Lab Monitoring #: 1  Recommended intervention potential cost savings: 1  Time Spent (min): [employer]

## 2019-06-28 ENCOUNTER — OFFICE VISIT (OUTPATIENT)
Dept: PAIN MANAGEMENT | Age: 84
End: 2019-06-28
Payer: MEDICARE

## 2019-06-28 VITALS
HEIGHT: 66 IN | DIASTOLIC BLOOD PRESSURE: 73 MMHG | HEART RATE: 64 BPM | SYSTOLIC BLOOD PRESSURE: 143 MMHG | WEIGHT: 105 LBS | BODY MASS INDEX: 16.88 KG/M2

## 2019-06-28 DIAGNOSIS — S22.000S CLOSED COMPRESSION FRACTURE OF THORACIC VERTEBRA, SEQUELA: ICD-10-CM

## 2019-06-28 DIAGNOSIS — G89.29 OTHER CHRONIC PAIN: ICD-10-CM

## 2019-06-28 DIAGNOSIS — R54 ADVANCED AGE: ICD-10-CM

## 2019-06-28 DIAGNOSIS — S32.010A CLOSED COMPRESSION FRACTURE OF BODY OF L1 VERTEBRA (HCC): Primary | ICD-10-CM

## 2019-06-28 PROCEDURE — 1123F ACP DISCUSS/DSCN MKR DOCD: CPT | Performed by: ANESTHESIOLOGY

## 2019-06-28 PROCEDURE — G8427 DOCREV CUR MEDS BY ELIG CLIN: HCPCS | Performed by: ANESTHESIOLOGY

## 2019-06-28 PROCEDURE — G8419 CALC BMI OUT NRM PARAM NOF/U: HCPCS | Performed by: ANESTHESIOLOGY

## 2019-06-28 PROCEDURE — 99214 OFFICE O/P EST MOD 30 MIN: CPT | Performed by: ANESTHESIOLOGY

## 2019-06-28 PROCEDURE — 1036F TOBACCO NON-USER: CPT | Performed by: ANESTHESIOLOGY

## 2019-06-28 PROCEDURE — 4040F PNEUMOC VAC/ADMIN/RCVD: CPT | Performed by: ANESTHESIOLOGY

## 2019-06-28 RX ORDER — HYDROCODONE BITARTRATE AND ACETAMINOPHEN 5; 325 MG/1; MG/1
1 TABLET ORAL DAILY PRN
Qty: 10 TABLET | Refills: 0 | Status: SHIPPED | OUTPATIENT
Start: 2019-06-28 | End: 2019-07-05 | Stop reason: SDUPTHER

## 2019-06-28 ASSESSMENT — ENCOUNTER SYMPTOMS
COUGH: 0
WHEEZING: 0
EYE REDNESS: 0
VOMITING: 0
EYE DISCHARGE: 0
ABDOMINAL PAIN: 0
NAUSEA: 0
SHORTNESS OF BREATH: 0
EYE PAIN: 0
BACK PAIN: 1
CONSTIPATION: 0

## 2019-06-28 NOTE — PROGRESS NOTES
[address] [facility] [address], [place], [address]  [phone] (S)  [phone] (f)    6/28/19        Hailey Gregorio  4/1/1931      Macie Monteiro DO      Chief Complaint:   Chief Complaint   Patient presents with    Lower Back Pain     F/u low back pain       History of Present Illness   HPI    Seen us first on 06/12/2019 -  Chronic back pain for years; recent flare. Pain constnat in mid back achy sharp throbbing without radiation or weakness. Pain worse with daily activities standing walking and helped minimally by medications. RED FLAG symptoms (Symptoms may include, but not limited to, acute trauma,fever, drug use, malignancy, weakness, perianal numbness, bladder/bowel changes) since last visit - No    Changes since last visit:  Completed lumbar MRI - here to discuss results and options. Past Medical History:   Diagnosis Date    Current moderate episode of major depressive disorder without prior episode ([facility] [address] 10/3/2018    Fractures     L1     Hyperlipidemia 10/11/2013       Past Surgical History:   Procedure Laterality Date    BACK SURGERY      OTHER SURGICAL HISTORY      ulcer rupture. Allergies   Allergen Reactions    Diclofenac Rash    Tramadol Nausea And Vomiting       Current Outpatient Medications   Medication Sig Dispense Refill    HYDROcodone-acetaminophen (NORCO) 5-325 MG per tablet Take 1 tablet by mouth daily as needed for Pain for up to 10 days. Take lowest dose possible to manage pain 10 tablet 0    Elastic Bandages & Supports (LUMBAR BACK BRACE/SUPPORT PAD) MISC 1 each by Does not apply route daily as needed (Elastic lumbar soft brace) 1 each 0    ALPHAGAN P 0.1 % SOLN INT ONE GTT IN OU BID  5    atorvastatin (LIPITOR) 10 MG tablet TAKE ONE TABLET BY MOUTH DAILY 90 tablet 3    BETIMOL 0.5 % ophthalmic solution       latanoprost (XALATAN) 0.005 % ophthalmic solution        No current facility-administered medications for this visit. Family History   Problem Relation Age of Onset    Cancer Other         Melanoma     Other Neg Hx        Social History     Socioeconomic History    Marital status:       Spouse name: Not on file    Number of children: Not on file    Years of education: Not on file    Highest education level: Not on file   Occupational History    Not on file   Social Needs    Financial resource strain: Not on file    Food insecurity:     Worry: Not on file     Inability: Not on file    Transportation needs:     Medical: Not on file     Non-medical: Not on file   Tobacco Use    Smoking status: Former Smoker     Last attempt to quit: 2000     Years since quittin.1    Smokeless tobacco: Never Used   Substance and Sexual Activity    Alcohol use: No    Drug use: Never    Sexual activity: Not Currently   Lifestyle    Physical activity:     Days per week: Not on file     Minutes per session: Not on file    Stress: Not on file   Relationships    Social connections:     Talks on phone: Not on file     Gets together: Not on file     Attends [employer] service: Not on file     Active member of club or organization: Not on file     Attends meetings of clubs or organizations: Not on file     Relationship status: Not on file    Intimate partner violence:     Fear of current or ex partner: Not on file     Emotionally abused: Not on file     Physically abused: Not on file     Forced sexual activity: Not on file   Other Topics Concern    Not on file   Social History Narrative    Not on file         Imaging Studies:   MRI LS )  \"FINDINGS:   BONES/ALIGNMENT: There is a normal lumbar lordosis.  Assuming that the last   well-formed disc represents L5-S1, the conus medullaris ends at T12-L1 is   within normal limits.  There are chronic compression fractures of L2, L3, L4,   and L5.  There is an acute superior endplate fracture of L1 resulting in mild   loss of vertebral body height and 3.6 mm of retropulsed bone.  No epidural   hematoma is identified and there is no traumatic subluxation.       SPINAL CORD: The conus terminates normally.       SOFT TISSUES: No paraspinal mass identified.       L1-L2: There is no significant disc herniation, spinal canal stenosis or   neural foraminal narrowing.       L2-L3: There is no significant disc herniation, spinal canal stenosis or   neural foraminal narrowing.       L3-L4: There is no significant disc herniation, spinal canal stenosis or   neural foraminal narrowing.       L4-L5: There is no significant disc herniation, spinal canal stenosis or   neural foraminal narrowing.       L5-S1: Disc bulge and facet hypertrophy resulting in moderate to severe   right-sided neural foraminal stenosis.         Impression   Acute superior endplate fracture of H3.\"         Results of the above studies were personally reviewed by me with the patient indetail along with the images, when available. The patient was instructed to contact the primary care provider regarding other unrelated findings not addressed during today's visit. Review of Systems:   Review of Systems   Constitutional: Negative for chills and fever. HENT: Negative for hearing loss and tinnitus. Eyes: Negative for pain, discharge and redness. Respiratory: Negative for cough, shortness of breath and wheezing. Cardiovascular: Negative for chest pain and palpitations. Gastrointestinal: Negative for abdominal pain, constipation, nausea and vomiting. Genitourinary: Negative for frequency, hematuria and urgency. Musculoskeletal: Positive for back pain. Negative for myalgias and neck pain. Skin: Negative for rash. Neurological: Negative for dizziness, seizures, weakness and headaches. Hematological: Does not bruise/bleed easily. Psychiatric/Behavioral: Negative for suicidal ideas. The patient is not nervous/anxious.         The patient was instructed to contact primary care physician regarding ROS positives not being addressed during today's visit. Physical Exam:   Physical Exam   Constitutional: He is oriented to person, place, and time. No distress. No acute distress, ambulates without help  Accompanied by daughter   LUC:   Head: Normocephalic. Eyes: Pupils are equal, round, and reactive to light. EOM are normal.   Neck: Normal range of motion. Neck supple. No thyromegaly present. Cardiovascular: Normal rate, regular rhythm, normal heart sounds and intact distal pulses. Pulmonary/Chest: Effort normal and breath sounds normal. No respiratory distress. He exhibits no tenderness. Abdominal: Soft. Bowel sounds are normal. He exhibits no mass. There is no tenderness. There is no guarding. Musculoskeletal: Normal range of motion. He exhibits no tenderness or deformity. Kyphoscoliosis of thoracolumbar spine  No focal midline or paraspinal tenderness   Lymphadenopathy:     He has no cervical adenopathy. Neurological: He is alert and oriented to person, place, and time. He has normal strength. He displays normal reflexes. No cranial nerve deficit or sensory deficit. He exhibits normal muscle tone. Gait (antalgic) abnormal. Coordination normal. He displays no Babinski's sign on the right side. He displays no Babinski's sign on the left side. Reflex Scores:       Tricep reflexes are 1+ on the right side and 1+ on the left side. Bicep reflexes are 1+ on the right side and 1+ on the left side. Brachioradialis reflexes are 1+ on the right side and 1+ on the left side. Patellar reflexes are 1+ on the right side and 1+ on the left side. Achilles reflexes are 1+ on the right side and 1+ on the left side. Skin: Skin is warm. No rash noted. He is not diaphoretic. Psychiatric: He has a normal mood and affect.  His behavior is normal. Thought content normal.       Vitals:    06/28/19 1420   BP: (!) 143/73   Site: Right Upper Arm   Position: Sitting   Cuff Size: Medium Adult Pulse: 64   Weight: 105 lb (47.6 kg)   Height: 5' 6\" (1.676 m)       Body mass index is 16.95 kg/m². Pain Score:   7 /10    Goals of chronic pain therapy:      Multi-disciplinary comprehensive pain management with functional improvement and reducedopioid use. Prescription pain medication monitoring:      MEDD current = 0 to 12.50   ORT Score = 1   LOW     Other Risk factors - depression, anxiety, advanced age. Date of Last Medication Agreement: NA   Date Naloxone prescribed: NA     UDT:    Date of last UDT: NA    Adverse report: No     OARRS:    Checked today: Yes    Adverse report: No     Medication Effects -        Analgesia:      Average level of pain for the past month = 10/10     Percentage of pain relief = 0%     Activities Improved: Activities of daily living = 0%     Sleep = 0%     Mood = 0%     Social functioning = 0%     Adverse Effects: Any adverse effects: Yes     Type/Severity of side effect: constipation - on Miralax    Aberrant Behavior:     Requests for frequent early refills: No     Increased dose without authorization: No     Reports lost or stolen prescriptions: No     Attempts to obtain prescriptions from other providers: No     Use of pain medication in response to situational stressor: No      Increasingly unkempt or impaired: No     Negative mood changes: No     Abusing alcohol or illicit drugs: No     Appears intoxicated: No     Other: NA     Controlled Substances Monitoring:    Periodic Controlled Substance Monitoring: Possible medication side effects, risk of tolerance/dependence & alternative treatments discussed., No signs of potential drug abuse or diversion identified. , Assessed functional status. Galina Drew MD)      Overall Progress:       PEG Score = 10 / 10     Physical Therapy: N/A   Counseling: N/A   Injections: N/A     Patient compliance on plan of care:   Fair   Progress on current plan:  Fair    Assessment:      ICD-10-CM    1. Closed compression fracture of body of L1 vertebra (HCC) S32.010A HYDROcodone-acetaminophen (NORCO) 5-325 MG per tablet   2. Closed compression fracture of thoracic vertebra, sequela S22.000S HYDROcodone-acetaminophen (NORCO) 5-325 MG per tablet   3. Other chronic pain G89.29    4. Advanced age R48        Plan:     1. Chronic back pain due to multiple compression fractures - recent flare after fall 1 month ago. 2. Review of MRI results showed possible acute new L1 compression fracture with mild retropulsion  3. Reviewed films with patient and daughter in detail; discussed options including surgical consult. 4. At this time, he is taking tylenol/ibuprofen; reports that [facility] could be 'stronger' - he was taking only 1/2 tab qd  5. Offered Norco 5 mg qd; he takes it once in the morning. Counseled on limitations and encouraged regular laxative use (Miralax). 6. He wishes to try the medication for another week, before considering other options- call us back. Analgesic Plan:   Continue present regimen: Yes   Adjust dose of present analgesic: Yes - Norco 5 mg qd   Switch analgesics: No   Add/Adjust concomitant therapy: No    Education:    - Reviewed OARRS report in detail, including Narx Scores and Overdose Risk Score. - Encouraged regular home exercises and strengthening as long-termgoals. - Counseled on dual effects, limitations, side effects and long-term complications of opioids. Discussed proper use and potential life threatening side effects of inappropriate use. - Educational materialprovided on multidisciplinary chronic pain management, safe opioid use. Follow-up: call us back in a week    Patient engaged in shared decision making. The entire treatment plan was discussed with patient and agreed. Thank you for allowing us to participate in the care of your patient - please do not hesitate to contact us if you have any questions or concerns.           Gail Ahuja MD  Board Certified in Anesthesiology and Pain Medicine

## 2019-06-28 NOTE — PATIENT INSTRUCTIONS
Patient Education     Continue brace and use Norco for severe pain. Continue Miralax for constipation regularly  Call us back regarding surgical consult    Patient Education        hydrocodone (oral)  Pronunciation:  MILANA pelon SEARS done  Brand:  Hysingla ER, Zohydro ER  What is the most important information I should know about hydrocodone? MISUSE OF OPIOID MEDICINE CAN CAUSE ADDICTION, OVERDOSE, OR DEATH. Keep the medication in a place where others cannot get to it. Taking opioid medicine during pregnancy may cause life-threatening withdrawal symptoms in the . Fatal side effects can occur if you use opioid medicine with alcohol, or with other drugs that cause drowsiness or slow your breathing. What is hydrocodone? Hydrocodone is an opioid pain medication. Zohydro ER and Hysingla ER are extended-release forms of hydrocodone that are used for around-the-clock treatment of severe pain. Extended-release hydrocodone is not  for use on an as-needed basis for pain. Hydrocodone may also be used for purposes not listed in this medication guide. What should I discuss with my healthcare provider before taking hydrocodone? You should not use hydrocodone if you are allergic to it, or if you have:  · severe asthma or breathing problems; or  · a blockage in your stomach or intestines. Tell your doctor if you have ever had:  · any type of breathing problem or lung disease;  · a head injury, brain tumor, or seizures;  · drug or alcohol addiction, or mental illness;  · urination problems;  · liver or kidney disease;  · problems with your gallbladder, pancreas, or thyroid; or  · a heart rhythm disorder called long QT syndrome. If you use opioid medicine while you are pregnant, your baby could become dependent on the drug. This can cause life-threatening withdrawal symptoms in the baby after it is born. Babies born dependent on opioids may need medical treatment for several weeks.   Do not breast-feed while taking hydrocodone. This medicine can pass into breast milk and cause drowsiness, breathing problems, or death in a nursing baby. How should I take hydrocodone? Follow the directions on your prescription label and read all medication guides. Never use hydrocodone in larger amounts, or for longer than prescribed. Tell your doctor if you feel an increased urge to take more of this medicine. Never share opioid medicine with another person, especially someone with a history of drug abuse or addiction. MISUSE CAN CAUSE ADDICTION, OVERDOSE, OR DEATH. Keep the medication in a place where others cannot get to it. Selling or giving away opioid medicine is against the law. Your dose needs may be different if you have recently used a similar opioid pain medicine and your body is tolerant to it. Talk with your doctor if you are not sure you are opioid-tolerant. Do not crush, break, or open an extended-release pill. Swallow it whole to avoid exposure to a potentially fatal dose. Never crush or break a hydrocodone pill to inhale the powder or mix it into a liquid to inject the drug into your vein. This can cause death. Do not stop using this medicine suddenly after long-term use, or you could have unpleasant withdrawal symptoms. Ask your doctor how to safely stop using hydrocodone. Store at room temperature, away from heat, moisture, and light. Keep track of your medicine. You should be aware if anyone is using it improperly or without a prescription. Do not keep leftover opioid medication. Just one dose can cause death in someone using this medicine accidentally or improperly. Ask your pharmacist where to locate a drug take-back disposal program. If there is no take-back program, flush the unused medicine down the toilet. What happens if I miss a dose? Since hydrocodone is used for pain, you are not likely to miss a dose. Skip any missed dose if it is almost time for your next dose. Do not use two doses at one time.   What happens if I overdose? Seek emergency medical attention or call the Poison Help line at 1-142.449.8542. A hydrocodone overdose can be fatal, especially in a child or other person using the medicine without a prescription. Overdose symptoms may include slow heart rate, severe drowsiness, muscle weakness, cold and clammy skin, pinpoint pupils, very slow breathing, or coma. What should I avoid while taking hydrocodone? Do not drink alcohol. Dangerous side effects or death could occur. Avoid driving or hazardous activity until you know how this medicine will affect you. Dizziness or drowsiness can cause falls, accidents, or severe injuries. What are the possible side effects of hydrocodone? Get emergency medical help if you have signs of an allergic reaction: hives; difficult breathing; swelling of your face, lips, tongue, or throat. Opioid medicine can slow or stop your breathing, and death may occur. A person caring for you should seek emergency medical attention if you have slow breathing with long pauses, blue colored lips, or if you are hard to wake up. Stop using hydrocodone and call your doctor at once if you have:  · noisy breathing, sighing, shallow breathing;  · a slow heart rate or weak pulse;  · pain or burning when you urinate;  · confusion, tremors, severe drowsiness;  · a light-headed feeling, like you might pass out; or  · low cortisol levels --nausea, vomiting, loss of appetite, dizziness, worsening tiredness or weakness. Seek medical attention right away if you have symptoms of serotonin syndrome, such as: agitation, hallucinations, fever, sweating, shivering, fast heart rate, muscle stiffness, twitching, loss of coordination, nausea, vomiting, or diarrhea. Serious side effects may be more likely in older adults and those who are malnourished or debilitated. Long-term use of opioid medication may affect fertility (ability to have children) in men or women.  It is not known whether opioid effects on fertility are permanent. Common side effects may include:  · constipation, nausea, vomiting;  · dizziness, drowsiness, feeling tired;  · headache; or  · cold symptoms such as stuffy nose, sneezing, sore throat. This is not a complete list of side effects and others may occur. Call your doctor for medical advice about side effects. You may report side effects to FDA at 3-232-LUZ-4884. What other drugs will affect hydrocodone? You may have breathing problems or withdrawal symptoms if you start or stop taking certain other medicines. Tell your doctor if you also use an antibiotic, antifungal medication, heart or blood pressure medication, seizure medication, or medicine to treat HIV or hepatitis C. Opioid medication can interact with many other drugs and cause dangerous side effects or death. Be sure your doctor knows if you also use:  · cold or allergy medicines, bronchodilator asthma/COPD medication, or a diuretic (\"water pill\");  · medicines for motion sickness, irritable bowel syndrome, or overactive bladder;  · other narcotic medications --opioid pain medicine or prescription cough medicine;  · a sedative like Valium --diazepam, alprazolam, lorazepam, Xanax, Klonopin, Versed, and others;  · drugs that make you sleepy or slow your breathing --a sleeping pill, muscle relaxer, medicine to treat mood disorders or mental illness; or  · drugs that affect serotonin levels in your body --a stimulant, or medicine for depression, Parkinson's disease, migraine headaches, serious infections, or nausea and vomiting. This list is not complete. Other drugs may affect hydrocodone, including prescription and over-the-counter medicines, vitamins, and herbal products. Not all possible interactions are listed here. Where can I get more information? Your doctor or pharmacist can provide more information about hydrocodone.   Remember, keep this and all other medicines out of the reach of children, never share your medicines with others, and use this medication only for the indication prescribed. Every effort has been made to ensure that the information provided by Yari Rodrig[address] is accurate, up-to-date, and complete, but no guarantee is made to that effect. Drug information contained herein may be time sensitive. [facility] information has been compiled for use by healthcare practitioners and consumers in the [place] and therefore [facility] does not warrant that uses outside of the [place] are appropriate, unless specifically indicated otherwise. [facility]'s drug information does not endorse drugs, diagnose patients or recommend therapy. [facility]'s drug information is an informational resource designed to assist licensed healthcare practitioners in caring for their patients and/or to serve consumers viewing this service as a supplement to, and not a substitute for, the expertise, skill, knowledge and judgment of healthcare practitioners. The absence of a warning for a given drug or drug combination in no way should be construed to indicate that the drug or drug combination is safe, effective or appropriate for any given patient. [facility] does not assume any responsibility for any aspect of healthcare administered with the aid of information [facility] provides. The information contained herein is not intended to cover all possible uses, directions, precautions, warnings, drug interactions, allergic reactions, or adverse effects. If you have questions about the drugs you are taking, check with your doctor, nurse or pharmacist.  Copyright 0912-4186 [address]. Version: 5.02. Revision date: 11/28/2018. Care instructions adapted under license by [facility] ([facility]). If you have questions about a medical condition or this instruction, always ask your healthcare professional. [place] [address] any warranty or liability for your use of this information.

## 2019-07-05 DIAGNOSIS — S22.000S CLOSED COMPRESSION FRACTURE OF THORACIC VERTEBRA, SEQUELA: ICD-10-CM

## 2019-07-05 DIAGNOSIS — S32.010A CLOSED COMPRESSION FRACTURE OF BODY OF L1 VERTEBRA (HCC): ICD-10-CM

## 2019-07-05 RX ORDER — HYDROCODONE BITARTRATE AND ACETAMINOPHEN 5; 325 MG/1; MG/1
1 TABLET ORAL DAILY PRN
Qty: 14 TABLET | Refills: 0 | Status: SHIPPED | OUTPATIENT
Start: 2019-07-05 | End: 2019-07-19

## 2019-07-05 NOTE — TELEPHONE ENCOUNTER
OARRS reviewed -  Norco filled for PRN use for 2 weeks (sent by e-script).   Needs follow up after that

## 2019-07-07 DIAGNOSIS — G89.29 OTHER CHRONIC PAIN: ICD-10-CM

## 2019-07-08 RX ORDER — SENNOSIDES 8.6 MG
TABLET ORAL
Qty: 14 TABLET | Refills: 0 | Status: SHIPPED | OUTPATIENT
Start: 2019-07-08 | End: 2019-07-29 | Stop reason: CLARIF

## 2019-07-26 ENCOUNTER — NURSE TRIAGE (OUTPATIENT)
Dept: OTHER | Facility: CLINIC | Age: 84
End: 2019-07-26

## 2019-07-29 ENCOUNTER — HOSPITAL ENCOUNTER (OUTPATIENT)
Dept: GENERAL RADIOLOGY | Age: 84
Discharge: HOME | End: 2019-07-29
Payer: MEDICARE

## 2019-07-29 ENCOUNTER — OFFICE VISIT (OUTPATIENT)
Dept: FAMILY MEDICINE CLINIC | Age: 84
End: 2019-07-29
Payer: MEDICARE

## 2019-07-29 VITALS
BODY MASS INDEX: 17.19 KG/M2 | WEIGHT: 107 LBS | HEIGHT: 66 IN | OXYGEN SATURATION: 96 % | DIASTOLIC BLOOD PRESSURE: 62 MMHG | SYSTOLIC BLOOD PRESSURE: 126 MMHG | HEART RATE: 64 BPM

## 2019-07-29 DIAGNOSIS — R06.09 EXERTIONAL DYSPNEA: ICD-10-CM

## 2019-07-29 DIAGNOSIS — M79.89 LEG SWELLING: ICD-10-CM

## 2019-07-29 DIAGNOSIS — R06.09 EXERTIONAL DYSPNEA: Primary | ICD-10-CM

## 2019-07-29 DIAGNOSIS — Z13.31 POSITIVE DEPRESSION SCREENING: ICD-10-CM

## 2019-07-29 LAB
A/G RATIO: 1.8 (ref 1.1–2.2)
ALBUMIN SERPL-MCNC: 4.4 G/DL (ref 3.4–5)
ALP BLD-CCNC: 101 U/L (ref 40–129)
ALT SERPL-CCNC: 6 U/L (ref 10–40)
ANION GAP SERPL CALCULATED.3IONS-SCNC: 11 MMOL/L (ref 3–16)
AST SERPL-CCNC: 20 U/L (ref 15–37)
BASOPHILS ABSOLUTE: 0 K/UL (ref 0–0.2)
BASOPHILS RELATIVE PERCENT: 0.7 %
BILIRUB SERPL-MCNC: 0.4 MG/DL (ref 0–1)
BILIRUBIN, POC: NORMAL
BLOOD URINE, POC: NORMAL
BUN BLDV-MCNC: 17 MG/DL (ref 7–20)
CALCIUM SERPL-MCNC: 9.5 MG/DL (ref 8.3–10.6)
CHLORIDE BLD-SCNC: 103 MMOL/L (ref 99–110)
CLARITY, POC: NORMAL
CO2: 28 MMOL/L (ref 21–32)
COLOR, POC: NORMAL
CREAT SERPL-MCNC: 0.9 MG/DL (ref 0.8–1.3)
EOSINOPHILS ABSOLUTE: 0.6 K/UL (ref 0–0.6)
EOSINOPHILS RELATIVE PERCENT: 9.5 %
GFR AFRICAN AMERICAN: >60
GFR NON-AFRICAN AMERICAN: >60
GLOBULIN: 2.4 G/DL
GLUCOSE BLD-MCNC: 86 MG/DL (ref 70–99)
GLUCOSE URINE, POC: NORMAL
HCT VFR BLD CALC: 36.7 % (ref 40.5–52.5)
HEMOGLOBIN: 12 G/DL (ref 13.5–17.5)
KETONES, POC: NORMAL
LEUKOCYTE EST, POC: NORMAL
LYMPHOCYTES ABSOLUTE: 1.2 K/UL (ref 1–5.1)
LYMPHOCYTES RELATIVE PERCENT: 20.2 %
MCH RBC QN AUTO: 32 PG (ref 26–34)
MCHC RBC AUTO-ENTMCNC: 32.6 G/DL (ref 31–36)
MCV RBC AUTO: 98 FL (ref 80–100)
MONOCYTES ABSOLUTE: 0.7 K/UL (ref 0–1.3)
MONOCYTES RELATIVE PERCENT: 11.6 %
NEUTROPHILS ABSOLUTE: 3.4 K/UL (ref 1.7–7.7)
NEUTROPHILS RELATIVE PERCENT: 58 %
NITRITE, POC: NORMAL
PDW BLD-RTO: 14.2 % (ref 12.4–15.4)
PH, POC: 7
PLATELET # BLD: 180 K/UL (ref 135–450)
PMV BLD AUTO: 10.2 FL (ref 5–10.5)
POTASSIUM SERPL-SCNC: 4.9 MMOL/L (ref 3.5–5.1)
PRO-BNP: 99 PG/ML (ref 0–449)
PROTEIN, POC: NORMAL
RBC # BLD: 3.74 M/UL (ref 4.2–5.9)
SODIUM BLD-SCNC: 142 MMOL/L (ref 136–145)
SPECIFIC GRAVITY, POC: 1.02
TOTAL PROTEIN: 6.8 G/DL (ref 6.4–8.2)
UROBILINOGEN, POC: 1
WBC # BLD: 5.8 K/UL (ref 4–11)

## 2019-07-29 PROCEDURE — G8427 DOCREV CUR MEDS BY ELIG CLIN: HCPCS | Performed by: NURSE PRACTITIONER

## 2019-07-29 PROCEDURE — 1036F TOBACCO NON-USER: CPT | Performed by: NURSE PRACTITIONER

## 2019-07-29 PROCEDURE — 1123F ACP DISCUSS/DSCN MKR DOCD: CPT | Performed by: NURSE PRACTITIONER

## 2019-07-29 PROCEDURE — 71046 X-RAY EXAM CHEST 2 VIEWS: CPT

## 2019-07-29 PROCEDURE — 81002 URINALYSIS NONAUTO W/O SCOPE: CPT | Performed by: NURSE PRACTITIONER

## 2019-07-29 PROCEDURE — 99213 OFFICE O/P EST LOW 20 MIN: CPT | Performed by: NURSE PRACTITIONER

## 2019-07-29 PROCEDURE — G8431 POS CLIN DEPRES SCRN F/U DOC: HCPCS | Performed by: NURSE PRACTITIONER

## 2019-07-29 PROCEDURE — G8419 CALC BMI OUT NRM PARAM NOF/U: HCPCS | Performed by: NURSE PRACTITIONER

## 2019-07-29 PROCEDURE — 4040F PNEUMOC VAC/ADMIN/RCVD: CPT | Performed by: NURSE PRACTITIONER

## 2019-07-29 ASSESSMENT — ENCOUNTER SYMPTOMS
SINUS PAIN: 0
VOMITING: 0
BACK PAIN: 1
SHORTNESS OF BREATH: 0
SINUS PRESSURE: 0
STRIDOR: 0
CHOKING: 0
DIARRHEA: 0
WHEEZING: 0
APNEA: 0
CHEST TIGHTNESS: 0
NAUSEA: 0
COUGH: 1

## 2019-07-29 NOTE — PROGRESS NOTES
2019    Chief Complaint   Patient presents with    Foot Swelling     both feet, with redness, and some pain with movement. HPI: Caesar Bashir is a 80 y.o. male who presents with his daughter today. They report two week history of swelling of the lower extremities and feet. Pt denies pain but feels \"tight\" with movement of the ankles. Denies injury. He has been sleeping in a recliner due to difficulty pulling himself up in his normal bed from pain related to L1 compression fracture. Does not always elevate his legs when he is sitting in the recliner, his daughter is wondering if this is the cause of the leg swelling. Pt denies dyspnea however his daughter states she has noticed him to be more short of breath with walking, talking ad singing. They have a family gathering a few weeks ago and he went to sing and she noticed he seemed much more out of breath than normal. Pt denies CP, palpitations, dizziness. Pt's daughter states he is always has throat clearing cough and pt states its is from \"my sinuses are bad. \" Pt is seeing Dr. Nettie Ramirez for management of pain related to compression fracture-- he was prescribed hydrocodone but stopped taking it the past two weeks due to itching. Past Medical History:   Diagnosis Date    Current moderate episode of major depressive disorder without prior episode ([facility] [address] 10/3/2018    Fractures     L1     Hyperlipidemia 10/11/2013       Past Surgical History:   Procedure Laterality Date    BACK SURGERY      OTHER SURGICAL HISTORY      ulcer rupture. Social History     Tobacco Use    Smoking status: Former Smoker     Last attempt to quit: 2000     Years since quittin.2    Smokeless tobacco: Never Used   Substance Use Topics    Alcohol use: No    Drug use: Never       Family History   Problem Relation Age of Onset    Cancer Other         Melanoma     Other Neg Hx        Review of Systems   Constitutional: Negative for fatigue and fever.    HENT: Positive for postnasal drip. Negative for sinus pressure and sinus pain. Respiratory: Positive for cough. Negative for apnea, choking, chest tightness, shortness of breath, wheezing and stridor. Cardiovascular: Positive for leg swelling. Negative for chest pain and palpitations. Gastrointestinal: Negative for diarrhea, nausea and vomiting. Musculoskeletal: Positive for back pain (L1 compression fracture). Neurological: Negative for dizziness and headaches. Physical Exam   Constitutional: He is oriented to person, place, and time. He appears well-developed and well-nourished. No distress. HENT:   Head: Normocephalic and atraumatic. Right Ear: External ear normal.   Left Ear: External ear normal.   Mouth/Throat: No oropharyngeal exudate. Eyes: Pupils are equal, round, and reactive to light. Conjunctivae and EOM are normal. Right eye exhibits no discharge. Left eye exhibits no discharge. No scleral icterus. Neck: Normal range of motion. Neck supple. Cardiovascular: Normal rate, regular rhythm and normal heart sounds. Exam reveals no gallop and no friction rub. No murmur heard. Pulses:       Dorsalis pedis pulses are 1+ on the right side, and 1+ on the left side. Posterior tibial pulses are 1+ on the right side, and 1+ on the left side. +1 pitting edema BLE-- mid calf to foot   Pulmonary/Chest: Effort normal. No respiratory distress. He has no wheezes. He has rales in the left lower field. He exhibits no tenderness. Feet:   Right Foot:   Skin Integrity: Positive for warmth. Negative for skin breakdown or erythema. Left Foot:   Skin Integrity: Positive for warmth. Negative for skin breakdown or erythema. Lymphadenopathy:     He has no cervical adenopathy. Neurological: He is alert and oriented to person, place, and time. No cranial nerve deficit. Skin: Skin is warm and dry. He is not diaphoretic. Nursing note and vitals reviewed.     Vitals:    07/29/19 1116   BP: 126/62

## 2019-08-07 ASSESSMENT — ENCOUNTER SYMPTOMS
SHORTNESS OF BREATH: 0
EYE REDNESS: 0
CONSTIPATION: 0
VOMITING: 0
NAUSEA: 0
DIARRHEA: 0

## 2019-08-16 ENCOUNTER — OFFICE VISIT (OUTPATIENT)
Dept: FAMILY MEDICINE CLINIC | Age: 84
End: 2019-08-16
Payer: MEDICARE

## 2019-08-16 VITALS
HEART RATE: 74 BPM | SYSTOLIC BLOOD PRESSURE: 106 MMHG | OXYGEN SATURATION: 98 % | WEIGHT: 105 LBS | BODY MASS INDEX: 16.88 KG/M2 | DIASTOLIC BLOOD PRESSURE: 80 MMHG | HEIGHT: 66 IN

## 2019-08-16 DIAGNOSIS — R05.3 CHRONIC COUGH: ICD-10-CM

## 2019-08-16 DIAGNOSIS — Z87.891 FORMER SMOKER: ICD-10-CM

## 2019-08-16 DIAGNOSIS — R09.89 BIBASILAR CRACKLES: Primary | ICD-10-CM

## 2019-08-16 PROCEDURE — G8427 DOCREV CUR MEDS BY ELIG CLIN: HCPCS | Performed by: FAMILY MEDICINE

## 2019-08-16 PROCEDURE — 1036F TOBACCO NON-USER: CPT | Performed by: FAMILY MEDICINE

## 2019-08-16 PROCEDURE — 99214 OFFICE O/P EST MOD 30 MIN: CPT | Performed by: FAMILY MEDICINE

## 2019-08-16 PROCEDURE — G8419 CALC BMI OUT NRM PARAM NOF/U: HCPCS | Performed by: FAMILY MEDICINE

## 2019-08-16 PROCEDURE — 1123F ACP DISCUSS/DSCN MKR DOCD: CPT | Performed by: FAMILY MEDICINE

## 2019-08-16 PROCEDURE — 4040F PNEUMOC VAC/ADMIN/RCVD: CPT | Performed by: FAMILY MEDICINE

## 2019-08-16 RX ORDER — HYDROCHLOROTHIAZIDE 25 MG/1
25 TABLET ORAL EVERY MORNING
Qty: 30 TABLET | Refills: 2 | Status: SHIPPED | OUTPATIENT
Start: 2019-08-16 | End: 2019-08-16 | Stop reason: SDUPTHER

## 2019-08-19 RX ORDER — HYDROCHLOROTHIAZIDE 25 MG/1
25 TABLET ORAL EVERY MORNING
Qty: 90 TABLET | Refills: 2 | Status: SHIPPED | OUTPATIENT
Start: 2019-08-19 | End: 2019-11-05

## 2019-09-10 ENCOUNTER — HOSPITAL ENCOUNTER (OUTPATIENT)
Dept: CT IMAGING | Age: 84
Discharge: HOME | End: 2019-09-10
Payer: MEDICARE

## 2019-09-10 ENCOUNTER — HOSPITAL ENCOUNTER (OUTPATIENT)
Dept: PULMONOLOGY | Age: 84
Discharge: HOME | End: 2019-09-10
Payer: MEDICARE

## 2019-09-10 DIAGNOSIS — R09.89 BIBASILAR CRACKLES: ICD-10-CM

## 2019-09-10 DIAGNOSIS — R05.3 CHRONIC COUGH: ICD-10-CM

## 2019-09-10 PROCEDURE — 71250 CT THORAX DX C-: CPT

## 2019-09-10 PROCEDURE — 6370000000 HC RX 637 (ALT 250 FOR IP): Performed by: FAMILY MEDICINE

## 2019-09-10 RX ORDER — ALBUTEROL SULFATE 90 UG/1
4 AEROSOL, METERED RESPIRATORY (INHALATION) ONCE
Status: DISCONTINUED | OUTPATIENT
Start: 2019-09-10 | End: 2019-09-10

## 2019-09-10 NOTE — PROGRESS NOTES
After several attempts with patient, he was unable to do the [facility], pt had hard time understanding procedure and efforts were not consistent or adequate to perform the test.

## 2019-09-20 ENCOUNTER — OFFICE VISIT (OUTPATIENT)
Dept: FAMILY MEDICINE CLINIC | Age: 84
End: 2019-09-20
Payer: MEDICARE

## 2019-09-20 VITALS
SYSTOLIC BLOOD PRESSURE: 118 MMHG | WEIGHT: 103 LBS | OXYGEN SATURATION: 98 % | BODY MASS INDEX: 16.55 KG/M2 | DIASTOLIC BLOOD PRESSURE: 82 MMHG | HEART RATE: 75 BPM | HEIGHT: 66 IN

## 2019-09-20 DIAGNOSIS — L85.3 XEROSIS CUTIS: ICD-10-CM

## 2019-09-20 DIAGNOSIS — J84.10 PULMONARY FIBROSIS (HCC): Primary | ICD-10-CM

## 2019-09-20 DIAGNOSIS — J43.9 PULMONARY EMPHYSEMA, UNSPECIFIED EMPHYSEMA TYPE (HCC): ICD-10-CM

## 2019-09-20 PROCEDURE — G0008 ADMIN INFLUENZA VIRUS VAC: HCPCS | Performed by: FAMILY MEDICINE

## 2019-09-20 PROCEDURE — 1036F TOBACCO NON-USER: CPT | Performed by: FAMILY MEDICINE

## 2019-09-20 PROCEDURE — G8427 DOCREV CUR MEDS BY ELIG CLIN: HCPCS | Performed by: FAMILY MEDICINE

## 2019-09-20 PROCEDURE — 4040F PNEUMOC VAC/ADMIN/RCVD: CPT | Performed by: FAMILY MEDICINE

## 2019-09-20 PROCEDURE — 3023F SPIROM DOC REV: CPT | Performed by: FAMILY MEDICINE

## 2019-09-20 PROCEDURE — G8419 CALC BMI OUT NRM PARAM NOF/U: HCPCS | Performed by: FAMILY MEDICINE

## 2019-09-20 PROCEDURE — 90653 IIV ADJUVANT VACCINE IM: CPT | Performed by: FAMILY MEDICINE

## 2019-09-20 PROCEDURE — 1123F ACP DISCUSS/DSCN MKR DOCD: CPT | Performed by: FAMILY MEDICINE

## 2019-09-20 PROCEDURE — 99214 OFFICE O/P EST MOD 30 MIN: CPT | Performed by: FAMILY MEDICINE

## 2019-09-20 PROCEDURE — G8926 SPIRO NO PERF OR DOC: HCPCS | Performed by: FAMILY MEDICINE

## 2019-09-20 RX ORDER — BUDESONIDE AND FORMOTEROL FUMARATE DIHYDRATE 80; 4.5 UG/1; UG/1
2 AEROSOL RESPIRATORY (INHALATION) 2 TIMES DAILY
Qty: 1 INHALER | Refills: 3 | Status: SHIPPED | OUTPATIENT
Start: 2019-09-20 | End: 2020-01-01

## 2019-09-20 ASSESSMENT — ENCOUNTER SYMPTOMS
NAUSEA: 0
SHORTNESS OF BREATH: 1
EYE REDNESS: 0
ABDOMINAL PAIN: 0
SWOLLEN GLANDS: 0
WHEEZING: 0
CONSTIPATION: 0
DIARRHEA: 0
SORE THROAT: 0
CHEST TIGHTNESS: 0
VOMITING: 0

## 2019-09-20 NOTE — PROGRESS NOTES
file    Food insecurity:     Worry: Not on file     Inability: Not on file    Transportation needs:     Medical: Not on file     Non-medical: Not on file   Tobacco Use    Smoking status: Former Smoker     Last attempt to quit: 2000     Years since quittin.3    Smokeless tobacco: Never Used   Substance and Sexual Activity    Alcohol use: No    Drug use: Never    Sexual activity: Not Currently   Lifestyle    Physical activity:     Days per week: Not on file     Minutes per session: Not on file    Stress: Not on file   Relationships    Social connections:     Talks on phone: Not on file     Gets together: Not on file     Attends Jew service: Not on file     Active member of club or organization: Not on file     Attends meetings of clubs or organizations: Not on file     Relationship status: Not on file    Intimate partner violence:     Fear of current or ex partner: Not on file     Emotionally abused: Not on file     Physically abused: Not on file     Forced sexual activity: Not on file   Other Topics Concern    Not on file   Social History Narrative    Not on file           Review of Systems   Constitutional: Positive for unexpected weight change. Negative for fever. HENT: Negative. Negative for sore throat. Eyes: Negative for redness. Respiratory: Positive for shortness of breath. Negative for chest tightness and wheezing. Improving slightly   Cardiovascular: Negative for chest pain, palpitations and leg swelling. Leg swelling decreased with lasix   Gastrointestinal: Negative for abdominal pain, constipation, diarrhea, nausea and vomiting. Skin: Negative for pallor. Dry skin, itch   Allergic/Immunologic: Negative for immunocompromised state. Psychiatric/Behavioral: Negative for confusion. All other systems reviewed and are negative.     Current Outpatient Medications   Medication Sig Dispense Refill    hydrochlorothiazide (HYDRODIURIL) 25 MG tablet TAKE 1 TABLET BY MOUTH EVERY MORNING 90 tablet 2    Elastic Bandages & Supports (LUMBAR BACK BRACE/SUPPORT PAD) MISC 1 each by Does not apply route daily as needed (Elastic lumbar soft brace) 1 each 0    ALPHAGAN P 0.1 % SOLN INT ONE GTT IN OU BID  5    atorvastatin (LIPITOR) 10 MG tablet TAKE ONE TABLET BY MOUTH DAILY 90 tablet 3    BETIMOL 0.5 % ophthalmic solution       latanoprost (XALATAN) 0.005 % ophthalmic solution        No current facility-administered medications for this visit. Vitals:    09/20/19 1142   BP: 118/82   Pulse: 75   SpO2: 98%         Physical Exam  Physical Exam   Constitutional: He is oriented to person, place, and time. He appears well-developed and well-nourished. No distress. HENT:   Head: Normocephalic and atraumatic. Eyes: Conjunctivae are normal.   Cardiovascular: Normal rate, regular rhythm and normal heart sounds. Pulmonary/Chest: Effort normal and breath sounds normal. No stridor. He has no wheezes. Fine crackles bilat   Musculoskeletal: He exhibits no edema. Neurological: He is alert and oriented to person, place, and time. Coordination normal.   Skin: Skin is warm and dry. He is not diaphoretic. No pallor. Xerotic skin   Psychiatric: He has a normal mood and affect. His behavior is normal. Judgment and thought content normal.        Diagnosis Orders   1. Pulmonary fibrosis (HCC)  budesonide-formoterol (SYMBICORT) 80-4.5 MCG/ACT AERO    Spacer/Aero-Holding Chambers (E-Z SPACER) MANN   2. Pulmonary emphysema, unspecified emphysema type (HCC)  budesonide-formoterol (SYMBICORT) 80-4.5 MCG/ACT AERO    Spacer/Aero-Holding Chambers (E-Z SPACER) MANN   3. Xerosis cutis       Airam Cota was seen today for discuss labs. Diagnoses and all orders for this visit:    Pulmonary fibrosis (HCC),new, will start tx  -     budesonide-formoterol (SYMBICORT) 80-4.5 MCG/ACT AERO;  Inhale 2 puffs into the lungs 2 times daily  -     Spacer/Aero-Holding Chambers (E-Z SPACER) MANN; 1 Device

## 2019-09-20 NOTE — PROGRESS NOTES
Vaccine Information Sheet, \"Influenza - Inactivated\"  given to Ginger Beckham, or parent/legal guardian of  Ginger Beckham and verbalized understanding. Patient responses:    Have you ever had a reaction to a flu vaccine? No  Are you able to eat eggs without adverse effects? No  Do you have any current illness? No  Have you ever had [place] Syndrome? No    Flu vaccine given per order. Please see immunization tab.

## 2019-11-05 RX ORDER — HYDROCHLOROTHIAZIDE 25 MG/1
25 TABLET ORAL EVERY MORNING
Qty: 30 TABLET | Refills: 0 | Status: SHIPPED | OUTPATIENT
Start: 2019-11-05 | End: 2019-01-01 | Stop reason: SDUPTHER

## 2020-01-01 ENCOUNTER — OFFICE VISIT (OUTPATIENT)
Dept: FAMILY MEDICINE CLINIC | Age: 85
End: 2020-01-01
Payer: MEDICARE

## 2020-01-01 ENCOUNTER — TELEPHONE (OUTPATIENT)
Dept: SURGERY | Age: 85
End: 2020-01-01

## 2020-01-01 ENCOUNTER — TELEPHONE (OUTPATIENT)
Dept: FAMILY MEDICINE CLINIC | Age: 85
End: 2020-01-01

## 2020-01-01 ENCOUNTER — CARE COORDINATION (OUTPATIENT)
Dept: CARE COORDINATION | Age: 85
End: 2020-01-01

## 2020-01-01 ENCOUNTER — APPOINTMENT (OUTPATIENT)
Dept: CT IMAGING | Age: 85
End: 2020-01-01
Payer: MEDICARE

## 2020-01-01 ENCOUNTER — APPOINTMENT (OUTPATIENT)
Dept: GENERAL RADIOLOGY | Age: 85
End: 2020-01-01
Payer: MEDICARE

## 2020-01-01 ENCOUNTER — TELEPHONE (OUTPATIENT)
Dept: DERMATOLOGY | Age: 85
End: 2020-01-01

## 2020-01-01 ENCOUNTER — NURSE TRIAGE (OUTPATIENT)
Dept: OTHER | Facility: CLINIC | Age: 85
End: 2020-01-01

## 2020-01-01 ENCOUNTER — HOSPITAL ENCOUNTER (EMERGENCY)
Age: 85
Discharge: HOME | End: 2020-09-28
Attending: EMERGENCY MEDICINE
Payer: MEDICARE

## 2020-01-01 ENCOUNTER — TELEPHONE (OUTPATIENT)
Dept: ADMINISTRATIVE | Age: 85
End: 2020-01-01

## 2020-01-01 ENCOUNTER — OFFICE VISIT (OUTPATIENT)
Dept: DERMATOLOGY | Age: 85
End: 2020-01-01
Payer: MEDICARE

## 2020-01-01 ENCOUNTER — TELEPHONE (OUTPATIENT)
Dept: PULMONOLOGY | Age: 85
End: 2020-01-01

## 2020-01-01 ENCOUNTER — OFFICE VISIT (OUTPATIENT)
Dept: PULMONOLOGY | Age: 85
End: 2020-01-01
Payer: MEDICARE

## 2020-01-01 ENCOUNTER — HOSPITAL ENCOUNTER (OUTPATIENT)
Dept: CT IMAGING | Age: 85
Discharge: HOME | End: 2020-10-08
Payer: MEDICARE

## 2020-01-01 ENCOUNTER — PROCEDURE VISIT (OUTPATIENT)
Dept: SURGERY | Age: 85
End: 2020-01-01
Payer: MEDICARE

## 2020-01-01 VITALS
TEMPERATURE: 98.7 F | HEIGHT: 65 IN | SYSTOLIC BLOOD PRESSURE: 98 MMHG | WEIGHT: 85 LBS | HEART RATE: 99 BPM | DIASTOLIC BLOOD PRESSURE: 70 MMHG | OXYGEN SATURATION: 97 % | BODY MASS INDEX: 14.16 KG/M2

## 2020-01-01 VITALS
SYSTOLIC BLOOD PRESSURE: 100 MMHG | HEART RATE: 108 BPM | RESPIRATION RATE: 16 BRPM | BODY MASS INDEX: 14.72 KG/M2 | WEIGHT: 91.2 LBS | DIASTOLIC BLOOD PRESSURE: 86 MMHG | TEMPERATURE: 98.6 F | OXYGEN SATURATION: 96 %

## 2020-01-01 VITALS
WEIGHT: 95 LBS | HEART RATE: 96 BPM | DIASTOLIC BLOOD PRESSURE: 72 MMHG | BODY MASS INDEX: 15.27 KG/M2 | OXYGEN SATURATION: 98 % | HEIGHT: 66 IN | TEMPERATURE: 98.7 F | SYSTOLIC BLOOD PRESSURE: 114 MMHG

## 2020-01-01 VITALS
DIASTOLIC BLOOD PRESSURE: 71 MMHG | HEART RATE: 91 BPM | WEIGHT: 97 LBS | BODY MASS INDEX: 15.59 KG/M2 | SYSTOLIC BLOOD PRESSURE: 117 MMHG | HEIGHT: 66 IN | OXYGEN SATURATION: 97 %

## 2020-01-01 VITALS
SYSTOLIC BLOOD PRESSURE: 126 MMHG | OXYGEN SATURATION: 97 % | DIASTOLIC BLOOD PRESSURE: 68 MMHG | HEART RATE: 111 BPM | WEIGHT: 96.8 LBS | BODY MASS INDEX: 15.56 KG/M2 | HEIGHT: 66 IN

## 2020-01-01 VITALS
TEMPERATURE: 97.7 F | DIASTOLIC BLOOD PRESSURE: 85 MMHG | HEIGHT: 66 IN | BODY MASS INDEX: 14.14 KG/M2 | HEART RATE: 105 BPM | WEIGHT: 88 LBS | SYSTOLIC BLOOD PRESSURE: 128 MMHG | OXYGEN SATURATION: 98 %

## 2020-01-01 VITALS
HEART RATE: 95 BPM | SYSTOLIC BLOOD PRESSURE: 101 MMHG | TEMPERATURE: 97.1 F | WEIGHT: 98 LBS | OXYGEN SATURATION: 97 % | DIASTOLIC BLOOD PRESSURE: 68 MMHG | BODY MASS INDEX: 15.82 KG/M2

## 2020-01-01 VITALS — TEMPERATURE: 98.2 F

## 2020-01-01 VITALS
WEIGHT: 88 LBS | TEMPERATURE: 97.8 F | SYSTOLIC BLOOD PRESSURE: 98 MMHG | RESPIRATION RATE: 14 BRPM | HEIGHT: 65 IN | BODY MASS INDEX: 14.66 KG/M2 | OXYGEN SATURATION: 92 % | DIASTOLIC BLOOD PRESSURE: 59 MMHG | HEART RATE: 90 BPM

## 2020-01-01 DIAGNOSIS — L29.9 PRURITUS: ICD-10-CM

## 2020-01-01 LAB
A/G RATIO: 1.4 (ref 1.1–2.2)
ALBUMIN SERPL-MCNC: 4.2 G/DL (ref 3.4–5)
ALP BLD-CCNC: 85 U/L (ref 40–129)
ALT SERPL-CCNC: 8 U/L (ref 10–40)
ANION GAP SERPL CALCULATED.3IONS-SCNC: 11 MMOL/L (ref 3–16)
ANION GAP SERPL CALCULATED.3IONS-SCNC: 17 MMOL/L (ref 3–16)
AST SERPL-CCNC: 30 U/L (ref 15–37)
BILIRUB SERPL-MCNC: 0.6 MG/DL (ref 0–1)
BILIRUBIN, POC: NORMAL
BILIRUBIN, POC: NORMAL
BLOOD URINE, POC: NORMAL
BLOOD URINE, POC: NORMAL
BUN BLDV-MCNC: 19 MG/DL (ref 7–20)
BUN BLDV-MCNC: 24 MG/DL (ref 7–20)
CALCIUM SERPL-MCNC: 9.9 MG/DL (ref 8.3–10.6)
CALCIUM SERPL-MCNC: 9.9 MG/DL (ref 8.3–10.6)
CHLORIDE BLD-SCNC: 96 MMOL/L (ref 99–110)
CHLORIDE BLD-SCNC: 97 MMOL/L (ref 99–110)
CLARITY, POC: NORMAL
CLARITY, POC: NORMAL
CO2: 27 MMOL/L (ref 21–32)
CO2: 34 MMOL/L (ref 21–32)
COLOR, POC: NORMAL
COLOR, POC: NORMAL
CREAT SERPL-MCNC: 1 MG/DL (ref 0.8–1.3)
CREAT SERPL-MCNC: 1.1 MG/DL (ref 0.8–1.3)
DERMATOLOGY PATHOLOGY REPORT: ABNORMAL
GFR AFRICAN AMERICAN: >60
GFR AFRICAN AMERICAN: >60
GFR NON-AFRICAN AMERICAN: >60
GFR NON-AFRICAN AMERICAN: >60
GLOBULIN: 3 G/DL
GLUCOSE BLD-MCNC: 101 MG/DL (ref 70–99)
GLUCOSE BLD-MCNC: 108 MG/DL (ref 70–99)
GLUCOSE URINE, POC: NORMAL
GLUCOSE URINE, POC: NORMAL
HCT VFR BLD CALC: 40.1 % (ref 40.5–52.5)
HCT VFR BLD CALC: 41.4 % (ref 40.5–52.5)
HEMOGLOBIN: 13.4 G/DL (ref 13.5–17.5)
HEMOGLOBIN: 13.8 G/DL (ref 13.5–17.5)
KETONES, POC: NORMAL
KETONES, POC: NORMAL
LEUKOCYTE EST, POC: NORMAL
LEUKOCYTE EST, POC: NORMAL
MCH RBC QN AUTO: 32 PG (ref 26–34)
MCH RBC QN AUTO: 32.8 PG (ref 26–34)
MCHC RBC AUTO-ENTMCNC: 33.3 G/DL (ref 31–36)
MCHC RBC AUTO-ENTMCNC: 33.4 G/DL (ref 31–36)
MCV RBC AUTO: 96 FL (ref 80–100)
MCV RBC AUTO: 98 FL (ref 80–100)
NITRITE, POC: NORMAL
NITRITE, POC: NORMAL
PDW BLD-RTO: 13.8 % (ref 12.4–15.4)
PDW BLD-RTO: 14.7 % (ref 12.4–15.4)
PH, POC: 6
PH, POC: 7
PLATELET # BLD: 181 K/UL (ref 135–450)
PLATELET # BLD: 239 K/UL (ref 135–450)
PMV BLD AUTO: 10.6 FL (ref 5–10.5)
PMV BLD AUTO: 9.1 FL (ref 5–10.5)
POTASSIUM SERPL-SCNC: 4.6 MMOL/L (ref 3.5–5.1)
POTASSIUM SERPL-SCNC: 4.8 MMOL/L (ref 3.5–5.1)
PROTEIN, POC: 30
PROTEIN, POC: NORMAL
RBC # BLD: 4.09 M/UL (ref 4.2–5.9)
RBC # BLD: 4.31 M/UL (ref 4.2–5.9)
SODIUM BLD-SCNC: 141 MMOL/L (ref 136–145)
SODIUM BLD-SCNC: 141 MMOL/L (ref 136–145)
SPECIFIC GRAVITY, POC: 1.02
SPECIFIC GRAVITY, POC: 1.02
TOTAL PROTEIN: 7.2 G/DL (ref 6.4–8.2)
TSH REFLEX: 1.11 UIU/ML (ref 0.27–4.2)
URINE CULTURE, ROUTINE: NORMAL
UROBILINOGEN, POC: 4
UROBILINOGEN, POC: NORMAL
WBC # BLD: 6.3 K/UL (ref 4–11)
WBC # BLD: 7.4 K/UL (ref 4–11)

## 2020-01-01 PROCEDURE — 1123F ACP DISCUSS/DSCN MKR DOCD: CPT | Performed by: FAMILY MEDICINE

## 2020-01-01 PROCEDURE — 99214 OFFICE O/P EST MOD 30 MIN: CPT | Performed by: FAMILY MEDICINE

## 2020-01-01 PROCEDURE — 1036F TOBACCO NON-USER: CPT | Performed by: INTERNAL MEDICINE

## 2020-01-01 PROCEDURE — 69100 BIOPSY OF EXTERNAL EAR: CPT | Performed by: DERMATOLOGY

## 2020-01-01 PROCEDURE — G8427 DOCREV CUR MEDS BY ELIG CLIN: HCPCS | Performed by: FAMILY MEDICINE

## 2020-01-01 PROCEDURE — G8427 DOCREV CUR MEDS BY ELIG CLIN: HCPCS | Performed by: DERMATOLOGY

## 2020-01-01 PROCEDURE — G8482 FLU IMMUNIZE ORDER/ADMIN: HCPCS | Performed by: FAMILY MEDICINE

## 2020-01-01 PROCEDURE — G8482 FLU IMMUNIZE ORDER/ADMIN: HCPCS | Performed by: NURSE PRACTITIONER

## 2020-01-01 PROCEDURE — 99213 OFFICE O/P EST LOW 20 MIN: CPT | Performed by: NURSE PRACTITIONER

## 2020-01-01 PROCEDURE — 97165 OT EVAL LOW COMPLEX 30 MIN: CPT

## 2020-01-01 PROCEDURE — 1123F ACP DISCUSS/DSCN MKR DOCD: CPT | Performed by: DERMATOLOGY

## 2020-01-01 PROCEDURE — G8419 CALC BMI OUT NRM PARAM NOF/U: HCPCS | Performed by: DERMATOLOGY

## 2020-01-01 PROCEDURE — 81003 URINALYSIS AUTO W/O SCOPE: CPT | Performed by: NURSE PRACTITIONER

## 2020-01-01 PROCEDURE — G8510 SCR DEP NEG, NO PLAN REQD: HCPCS | Performed by: FAMILY MEDICINE

## 2020-01-01 PROCEDURE — 1036F TOBACCO NON-USER: CPT | Performed by: NURSE PRACTITIONER

## 2020-01-01 PROCEDURE — 4040F PNEUMOC VAC/ADMIN/RCVD: CPT | Performed by: NURSE PRACTITIONER

## 2020-01-01 PROCEDURE — 17311 MOHS 1 STAGE H/N/HF/G: CPT | Performed by: DERMATOLOGY

## 2020-01-01 PROCEDURE — 99204 OFFICE O/P NEW MOD 45 MIN: CPT | Performed by: INTERNAL MEDICINE

## 2020-01-01 PROCEDURE — G8926 SPIRO NO PERF OR DOC: HCPCS | Performed by: INTERNAL MEDICINE

## 2020-01-01 PROCEDURE — G8926 SPIRO NO PERF OR DOC: HCPCS | Performed by: FAMILY MEDICINE

## 2020-01-01 PROCEDURE — 1036F TOBACCO NON-USER: CPT | Performed by: FAMILY MEDICINE

## 2020-01-01 PROCEDURE — 71250 CT THORAX DX C-: CPT

## 2020-01-01 PROCEDURE — 3288F FALL RISK ASSESSMENT DOCD: CPT | Performed by: FAMILY MEDICINE

## 2020-01-01 PROCEDURE — 99213 OFFICE O/P EST LOW 20 MIN: CPT | Performed by: DERMATOLOGY

## 2020-01-01 PROCEDURE — G8427 DOCREV CUR MEDS BY ELIG CLIN: HCPCS | Performed by: INTERNAL MEDICINE

## 2020-01-01 PROCEDURE — 1036F TOBACCO NON-USER: CPT | Performed by: DERMATOLOGY

## 2020-01-01 PROCEDURE — G8484 FLU IMMUNIZE NO ADMIN: HCPCS | Performed by: FAMILY MEDICINE

## 2020-01-01 PROCEDURE — G8419 CALC BMI OUT NRM PARAM NOF/U: HCPCS | Performed by: NURSE PRACTITIONER

## 2020-01-01 PROCEDURE — 70450 CT HEAD/BRAIN W/O DYE: CPT

## 2020-01-01 PROCEDURE — 73502 X-RAY EXAM HIP UNI 2-3 VIEWS: CPT

## 2020-01-01 PROCEDURE — 81002 URINALYSIS NONAUTO W/O SCOPE: CPT | Performed by: FAMILY MEDICINE

## 2020-01-01 PROCEDURE — 99284 EMERGENCY DEPT VISIT MOD MDM: CPT

## 2020-01-01 PROCEDURE — 4040F PNEUMOC VAC/ADMIN/RCVD: CPT | Performed by: DERMATOLOGY

## 2020-01-01 PROCEDURE — 4040F PNEUMOC VAC/ADMIN/RCVD: CPT | Performed by: FAMILY MEDICINE

## 2020-01-01 PROCEDURE — 6370000000 HC RX 637 (ALT 250 FOR IP): Performed by: EMERGENCY MEDICINE

## 2020-01-01 PROCEDURE — G8482 FLU IMMUNIZE ORDER/ADMIN: HCPCS | Performed by: DERMATOLOGY

## 2020-01-01 PROCEDURE — 1123F ACP DISCUSS/DSCN MKR DOCD: CPT | Performed by: INTERNAL MEDICINE

## 2020-01-01 PROCEDURE — G8427 DOCREV CUR MEDS BY ELIG CLIN: HCPCS | Performed by: NURSE PRACTITIONER

## 2020-01-01 PROCEDURE — 17003 DESTRUCT PREMALG LES 2-14: CPT | Performed by: DERMATOLOGY

## 2020-01-01 PROCEDURE — G8419 CALC BMI OUT NRM PARAM NOF/U: HCPCS | Performed by: FAMILY MEDICINE

## 2020-01-01 PROCEDURE — 1123F ACP DISCUSS/DSCN MKR DOCD: CPT | Performed by: NURSE PRACTITIONER

## 2020-01-01 PROCEDURE — 17000 DESTRUCT PREMALG LESION: CPT | Performed by: DERMATOLOGY

## 2020-01-01 PROCEDURE — 4040F PNEUMOC VAC/ADMIN/RCVD: CPT | Performed by: INTERNAL MEDICINE

## 2020-01-01 PROCEDURE — 36415 COLL VENOUS BLD VENIPUNCTURE: CPT | Performed by: NURSE PRACTITIONER

## 2020-01-01 PROCEDURE — 73700 CT LOWER EXTREMITY W/O DYE: CPT

## 2020-01-01 PROCEDURE — 90694 VACC AIIV4 NO PRSRV 0.5ML IM: CPT | Performed by: FAMILY MEDICINE

## 2020-01-01 PROCEDURE — G0008 ADMIN INFLUENZA VIRUS VAC: HCPCS | Performed by: FAMILY MEDICINE

## 2020-01-01 PROCEDURE — 3023F SPIROM DOC REV: CPT | Performed by: FAMILY MEDICINE

## 2020-01-01 PROCEDURE — G8419 CALC BMI OUT NRM PARAM NOF/U: HCPCS | Performed by: INTERNAL MEDICINE

## 2020-01-01 PROCEDURE — 1111F DSCHRG MED/CURRENT MED MERGE: CPT | Performed by: FAMILY MEDICINE

## 2020-01-01 PROCEDURE — 3023F SPIROM DOC REV: CPT | Performed by: INTERNAL MEDICINE

## 2020-01-01 PROCEDURE — 97162 PT EVAL MOD COMPLEX 30 MIN: CPT

## 2020-01-01 PROCEDURE — 3288F FALL RISK ASSESSMENT DOCD: CPT | Performed by: NURSE PRACTITIONER

## 2020-01-01 RX ORDER — HYDROCHLOROTHIAZIDE 25 MG/1
25 TABLET ORAL EVERY OTHER DAY
Qty: 90 TABLET | Refills: 3
Start: 2020-01-01

## 2020-01-01 RX ORDER — HYDROXYZINE HYDROCHLORIDE 25 MG/1
25 TABLET, FILM COATED ORAL NIGHTLY PRN
Qty: 30 TABLET | Refills: 0 | Status: SHIPPED | OUTPATIENT
Start: 2020-01-01 | End: 2020-01-01 | Stop reason: SDUPTHER

## 2020-01-01 RX ORDER — BUSPIRONE HYDROCHLORIDE 5 MG/1
5 TABLET ORAL 3 TIMES DAILY PRN
Qty: 90 TABLET | Refills: 0 | Status: SHIPPED | OUTPATIENT
Start: 2020-01-01 | End: 2020-01-01

## 2020-01-01 RX ORDER — FEXOFENADINE HCL 180 MG/1
180 TABLET ORAL DAILY
Qty: 30 TABLET | Refills: 0 | Status: SHIPPED | OUTPATIENT
Start: 2020-01-01 | End: 2020-01-01

## 2020-01-01 RX ORDER — HYDROCHLOROTHIAZIDE 25 MG/1
25 TABLET ORAL EVERY MORNING
Qty: 90 TABLET | Refills: 3 | Status: SHIPPED | OUTPATIENT
Start: 2020-01-01 | End: 2020-01-01

## 2020-01-01 RX ORDER — CIPROFLOXACIN 250 MG/1
250 TABLET, FILM COATED ORAL 2 TIMES DAILY
Qty: 6 TABLET | Refills: 0 | Status: SHIPPED | OUTPATIENT
Start: 2020-01-01 | End: 2020-01-01

## 2020-01-01 RX ORDER — HYDROXYZINE HYDROCHLORIDE 25 MG/1
TABLET, FILM COATED ORAL
Qty: 30 TABLET | Refills: 2 | Status: SHIPPED | OUTPATIENT
Start: 2020-01-01

## 2020-01-01 RX ORDER — BUSPIRONE HYDROCHLORIDE 5 MG/1
5 TABLET ORAL 3 TIMES DAILY PRN
Qty: 90 TABLET | Refills: 0 | Status: SHIPPED | OUTPATIENT
Start: 2020-01-01 | End: 2020-01-01 | Stop reason: SDUPTHER

## 2020-01-01 RX ORDER — ATORVASTATIN CALCIUM 10 MG/1
TABLET, FILM COATED ORAL
Qty: 90 TABLET | Refills: 3 | Status: SHIPPED | OUTPATIENT
Start: 2020-01-01

## 2020-01-01 RX ORDER — GUAIFENESIN 600 MG/1
1200 TABLET, EXTENDED RELEASE ORAL 2 TIMES DAILY
Qty: 60 TABLET | Refills: 0 | Status: SHIPPED | OUTPATIENT
Start: 2020-01-01

## 2020-01-01 RX ORDER — BUDESONIDE AND FORMOTEROL FUMARATE DIHYDRATE 160; 4.5 UG/1; UG/1
2 AEROSOL RESPIRATORY (INHALATION) 2 TIMES DAILY
Qty: 1 INHALER | Refills: 3 | Status: SHIPPED | OUTPATIENT
Start: 2020-01-01 | End: 2020-12-09 | Stop reason: SDUPTHER

## 2020-01-01 RX ORDER — BUDESONIDE AND FORMOTEROL FUMARATE DIHYDRATE 80; 4.5 UG/1; UG/1
AEROSOL RESPIRATORY (INHALATION)
Qty: 10.2 G | Refills: 1 | Status: SHIPPED | OUTPATIENT
Start: 2020-01-01 | End: 2020-01-01

## 2020-01-01 RX ORDER — HYDROXYZINE HYDROCHLORIDE 25 MG/1
25 TABLET, FILM COATED ORAL NIGHTLY PRN
Qty: 30 TABLET | Refills: 5 | Status: SHIPPED | OUTPATIENT
Start: 2020-01-01 | End: 2020-01-01

## 2020-01-01 RX ORDER — OXYCODONE HYDROCHLORIDE AND ACETAMINOPHEN 5; 325 MG/1; MG/1
1 TABLET ORAL EVERY 8 HOURS PRN
Qty: 9 TABLET | Refills: 0 | Status: SHIPPED | OUTPATIENT
Start: 2020-01-01 | End: 2020-01-01

## 2020-01-01 RX ORDER — TRIAMCINOLONE ACETONIDE 1 MG/G
CREAM TOPICAL
Qty: 60 G | Refills: 1 | Status: SHIPPED | OUTPATIENT
Start: 2020-01-01

## 2020-01-01 RX ORDER — OXYCODONE HYDROCHLORIDE AND ACETAMINOPHEN 5; 325 MG/1; MG/1
1 TABLET ORAL ONCE
Status: COMPLETED | OUTPATIENT
Start: 2020-01-01 | End: 2020-01-01

## 2020-01-01 RX ORDER — HYDROXYZINE HYDROCHLORIDE 25 MG/1
TABLET, FILM COATED ORAL
COMMUNITY
Start: 2020-01-01 | End: 2020-01-01 | Stop reason: SDUPTHER

## 2020-01-01 RX ADMIN — OXYCODONE HYDROCHLORIDE AND ACETAMINOPHEN 1 TABLET: 5; 325 TABLET ORAL at 14:19

## 2020-01-01 ASSESSMENT — ENCOUNTER SYMPTOMS
COUGH: 0
VOMITING: 0
DIARRHEA: 0
CONSTIPATION: 0
WHEEZING: 0
CHOKING: 0
CONSTIPATION: 1
VOMITING: 0
VOMITING: 0
DIARRHEA: 0
COLOR CHANGE: 1
DIARRHEA: 0
CONSTIPATION: 0
CONSTIPATION: 0
SHORTNESS OF BREATH: 1
DIARRHEA: 0
EYE REDNESS: 0
NAUSEA: 0
COUGH: 0
APNEA: 0
VOMITING: 0
SHORTNESS OF BREATH: 0
SHORTNESS OF BREATH: 0
SHORTNESS OF BREATH: 1
ROS SKIN COMMENTS: ITCH
EYE REDNESS: 0
NAUSEA: 0
DIARRHEA: 0
VOMITING: 0
NAUSEA: 0
NAUSEA: 0
STRIDOR: 0
SHORTNESS OF BREATH: 0
BACK PAIN: 1
EYE REDNESS: 0
CHEST TIGHTNESS: 0
VOMITING: 0
COUGH: 0
BACK PAIN: 1
NAUSEA: 0
COUGH: 1
NAUSEA: 0
SHORTNESS OF BREATH: 0

## 2020-01-01 ASSESSMENT — PAIN SCALES - GENERAL
PAINLEVEL_OUTOF10: 8
PAINLEVEL_OUTOF10: 6
PAINLEVEL_OUTOF10: 6
PAINLEVEL_OUTOF10: 8

## 2020-01-01 ASSESSMENT — PATIENT HEALTH QUESTIONNAIRE - PHQ9
2. FEELING DOWN, DEPRESSED OR HOPELESS: 0
1. LITTLE INTEREST OR PLEASURE IN DOING THINGS: 0
SUM OF ALL RESPONSES TO PHQ9 QUESTIONS 1 & 2: 0
SUM OF ALL RESPONSES TO PHQ QUESTIONS 1-9: 0
SUM OF ALL RESPONSES TO PHQ QUESTIONS 1-9: 0

## 2020-01-01 ASSESSMENT — PAIN DESCRIPTION - ORIENTATION: ORIENTATION: LEFT

## 2020-01-01 ASSESSMENT — PAIN DESCRIPTION - LOCATION: LOCATION: HIP

## 2020-01-08 NOTE — PROGRESS NOTES
20    Chief Complaint   Patient presents with    Rash     all over the body, x3-4 wks       HPI: Rita Moore is a 80 y.o. male who presents with complaints of itching to the legs and lower extremities. Pt reports that this started 3-4 weeks ago, however his son-in-law (that he lives with) has noticed this going on for several months. Patient states itching often will wake him up from sleep. He scratches his legs causing sores on his legs. No of which are painful or draining. He is wondering if this could related to one of his eye drop medications. His JAMIN reports he is only showering once every 3 weeks, pt states weekly. Denies rash, fever, vomiting, diarrhea or abdominal pain. Past Medical History:   Diagnosis Date    Current moderate episode of major depressive disorder without prior episode ([facility] [address] 10/3/2018    Fractures     L1     Hyperlipidemia 10/11/2013    Pulmonary emphysema ([facility] [address] 2019       Past Surgical History:   Procedure Laterality Date    BACK SURGERY      OTHER SURGICAL HISTORY      ulcer rupture. Social History     Tobacco Use    Smoking status: Former Smoker     Last attempt to quit: 2000     Years since quittin.6    Smokeless tobacco: Never Used   Substance Use Topics    Alcohol use: No    Drug use: Never       Family History   Problem Relation Age of Onset    Cancer Other         Melanoma     Other Neg Hx        Review of Systems   Constitutional: Negative for fatigue and fever. Respiratory: Negative for cough and shortness of breath. Cardiovascular: Negative for chest pain and leg swelling. Gastrointestinal: Negative for diarrhea, nausea and vomiting. Skin:        Itching on legs and arms   Neurological: Negative for dizziness and headaches. Physical Exam  Vitals signs and nursing note reviewed. Constitutional:       General: He is not in acute distress. Appearance: He is well-developed. He is not diaphoretic.    HENT:      Head: Normocephalic and atraumatic. Right Ear: External ear normal.      Left Ear: External ear normal.      Mouth/Throat:      Pharynx: No oropharyngeal exudate. Eyes:      General: No scleral icterus. Right eye: No discharge. Left eye: No discharge. Conjunctiva/sclera: Conjunctivae normal.      Pupils: Pupils are equal, round, and reactive to light. Neck:      Musculoskeletal: Normal range of motion and neck supple. Cardiovascular:      Rate and Rhythm: Normal rate and regular rhythm. Heart sounds: Normal heart sounds. No murmur. No friction rub. No gallop. Pulmonary:      Effort: Pulmonary effort is normal. No respiratory distress. Breath sounds: Normal breath sounds. No stridor. No wheezing, rhonchi or rales. Abdominal:      General: Abdomen is flat. Bowel sounds are normal. There is no distension. Palpations: Abdomen is soft. There is no mass. Tenderness: There is no tenderness. There is no guarding or rebound. Hernia: No hernia is present. Lymphadenopathy:      Cervical: No cervical adenopathy. Skin:     General: Skin is warm and dry. Comments: Small healing abrasions, consistent with scratching. Neurological:      Mental Status: He is alert and oriented to person, place, and time. Cranial Nerves: No cranial nerve deficit. Vitals:    01/08/20 1421   BP: 126/68   Site: Left Upper Arm   Position: Sitting   Pulse: 111   SpO2: 97%   Weight: 96 lb 12.8 oz (43.9 kg)   Height: 5' 6\" (1.676 m)       Assessment/Plan:  1.  Itch of skin  - Recommend using Lubriderm twice daily  - Shower 2 times weekly  - Can use allegra daily for itching  - Cool environment can help  - If persists would check labs; normal liver function in July 2019      Patient Instructions   Put lotion on the legs twice daily   Shower twice weekly and put the lotion on after showering   Taking Allegra once daily as needed for itching  Do not scratch with fingernails, rub with pads of fingers      Outpatient Encounter Medications as of 1/8/2020   Medication Sig Dispense Refill    fexofenadine (ALLEGRA) 180 MG tablet Take 1 tablet by mouth daily 30 tablet 0    hydrochlorothiazide (HYDRODIURIL) 25 MG tablet TAKE 1 TABLET BY MOUTH EVERY MORNING 30 tablet 5    budesonide-formoterol (SYMBICORT) 80-4.5 MCG/ACT AERO Inhale 2 puffs into the lungs 2 times daily 1 Inhaler 3    Spacer/Aero-Holding Chambers (E-Z SPACER) MANN 1 Device by Does not apply route daily as needed (inhaler use) 1 Device 0    Elastic Bandages & Supports (LUMBAR BACK BRACE/SUPPORT PAD) MISC 1 each by Does not apply route daily as needed (Elastic lumbar soft brace) 1 each 0    ALPHAGAN P 0.1 % SOLN INT ONE GTT IN OU BID  5    atorvastatin (LIPITOR) 10 MG tablet TAKE ONE TABLET BY MOUTH DAILY 90 tablet 3    BETIMOL 0.5 % ophthalmic solution       latanoprost (XALATAN) 0.005 % ophthalmic solution        No facility-administered encounter medications on file as of 1/8/2020.       Cornelio Bloom, CNP

## 2020-01-08 NOTE — PATIENT INSTRUCTIONS
Put lotion on the legs twice daily   Shower twice weekly and put the lotion on after showering   Taking Allegra once daily as needed for itching  Do not scratch with fingernails, rub with pads of fingers

## 2020-01-28 NOTE — TELEPHONE ENCOUNTER
I called Fili Hernandez back. Pt still itching. On Allegra not helping. Thinks it could be his nerves, will start on buspirone 5 mg TID PRN. Ordered labs to check liver and kidney function.

## 2020-02-10 NOTE — PATIENT INSTRUCTIONS
Sun Protection Tips    Apply broad spectrum water resistant sunscreen with an SPF of at least 30 to exposed areas of the skin. Dont forget the ears and lips! Remember to reapply sunscreen about every 2 hours and after swimming or sweating. Wear sun protective clothing. Swim shirts (aka. rash guards) are a great idea and negates the need to reapply sunscreen in those areas. Seek the shade whenever possible especially between the hours of 10am and 4pm when the suns rays are the strongest.     Avoid tanning beds        DRY SKIN CARE    1. Do not take more than 1 shower or bath each day. Try to spend 10 minutes or less in the shower/bath. 2. Use a moisturizing soap such as Dove bar soap. Avoid  Antibacterial soaps, can be too drying. 3. Use soap only on limited areas (face, underarms, groin). Try to avoid using soap on the arms, legs, trunk and back. 4. After showering, pat dry with a towel and generously apply a thick moisturizer all over. 5. If you are able, apply the moisturizer a second time during the day as well. 6. If a prescription cream or ointment has been ordered for you, apply the prescription medication to affected areas after your bath/shower while the skin is still damp, then apply the moisturizer as above. 7. When it comes to moisturizers, the thicker the better. Ointments (such as vaseline) are thicker than creams, and creams are thicker than lotions. Suggested over-the-counter moisturizer:    CeraVe Moisturizing Cream in a jar/tub, Only CeraVe contains the three essential ceramides healthy skin needs      CeraVe Facial Moisturizing Lotion with SPF 30 is great for the face      Biopsy Wound Care Instructions   Cleanse the wound with mild soapy water daily.  Gently dry the area.  Apply Vaseline or petroleum jelly to the wound using a cotton tipped applicator or Qtip.  Cover with a clean bandage.  Repeat this process until the biopsy site is healed.    You may

## 2020-02-10 NOTE — PROGRESS NOTES
Allergies reviewed. Past Skin Hx:  -History of actinic keratoses s/p cryotherapy   -4/2019- shave bx to posterior helix of left ear shows squamous acanthoma, no koilocytes but could consider verruca. Patient denies past history of melanoma, NMSC, dysplastic nevi     PFHx: melanoma in son      Family History   Problem Relation Age of Onset    Cancer Other         Melanoma     Other Neg Hx      Past Medical History:   Diagnosis Date    Current moderate episode of major depressive disorder without prior episode ([facility] [address] 10/3/2018    Fractures     L1     Hyperlipidemia 10/11/2013    Pulmonary emphysema ([facility] [address] 9/20/2019     Past Surgical History:   Procedure Laterality Date    BACK SURGERY      OTHER SURGICAL HISTORY      ulcer rupture.        Allergies   Allergen Reactions    Diclofenac Rash    Tramadol Nausea And Vomiting     Outpatient Medications Marked as Taking for the 2/10/20 encounter (Office Visit) with Vanesa Puente, DO   Medication Sig Dispense Refill    triamcinolone (KENALOG) 0.1 % cream Apply to affected areas on legs BID for 2 weeks, then BID sparingly prn flares no more than 2 days per week 60 g 1    busPIRone (BUSPAR) 5 MG tablet Take 1 tablet by mouth 3 times daily as needed (anxiety) 90 tablet 0    budesonide-formoterol (SYMBICORT) 80-4.5 MCG/ACT AERO INHALE 2 PUFFS INTO THE LUNGS TWICE DAILY 10.2 g 1    hydrochlorothiazide (HYDRODIURIL) 25 MG tablet TAKE 1 TABLET BY MOUTH EVERY MORNING 30 tablet 5    Spacer/Aero-Holding Chambers (E-Z SPACER) MANN 1 Device by Does not apply route daily as needed (inhaler use) 1 Device 0    Elastic Bandages & Supports (LUMBAR BACK BRACE/SUPPORT PAD) MISC 1 each by Does not apply route daily as needed (Elastic lumbar soft brace) 1 each 0    ALPHAGAN P 0.1 % SOLN INT ONE GTT IN OU BID  5    atorvastatin (LIPITOR) 10 MG tablet TAKE ONE TABLET BY MOUTH DAILY 90 tablet 3    BETIMOL 0.5 % ophthalmic solution       latanoprost (XALATAN) 0.005 % ophthalmic solution          Social History:   Social History     Socioeconomic History    Marital status:      Spouse name: Not on file    Number of children: Not on file    Years of education: Not on file    Highest education level: Not on file   Occupational History    Not on file   Social Needs    Financial resource strain: Not on file    Food insecurity:     Worry: Not on file     Inability: Not on file    Transportation needs:     Medical: Not on file     Non-medical: Not on file   Tobacco Use    Smoking status: Former Smoker     Last attempt to quit: 2000     Years since quittin.7    Smokeless tobacco: Never Used   Substance and Sexual Activity    Alcohol use: No    Drug use: Never    Sexual activity: Not Currently   Lifestyle    Physical activity:     Days per week: Not on file     Minutes per session: Not on file    Stress: Not on file   Relationships    Social connections:     Talks on phone: Not on file     Gets together: Not on file     Attends Synagogue service: Not on file     Active member of club or organization: Not on file     Attends meetings of clubs or organizations: Not on file     Relationship status: Not on file    Intimate partner violence:     Fear of current or ex partner: Not on file     Emotionally abused: Not on file     Physically abused: Not on file     Forced sexual activity: Not on file   Other Topics Concern    Not on file   Social History Narrative    Not on file       Physical Examination     The following were examined and determined to be normal: Psych/Neuro, Neck, Breast/axilla/chest, Abdomen, Back, RUE, LUE and Nails/digits. The following were examined and determined to be abnormal: Head/face, RLE and LLE. Bird phototype: 2    -General: A+Ox3, NAD, well-nourished, well-developed. Areas of skin examined as listed above:  1.  Posterior helix of left ear- 0.5x0.4cm erythematous firm papule with central focal hemorrhagic crusted spend less than 10 minutes in shower/bath, pat dry and then apply thick moisturizer like OTC CeraVe cream in jar plus apply CeraVetwice daily. Avoid fragrances and dyes and use only fragrance free detergents. 3. Seborrheic keratoses  Patient educated that seborrheic keratoses have no malignant potential.    -Reassurance provided to the patient regarding their chronic and benign nature. No treatment performed        Return to Clinic: PRN  Discussed plan with patient and/or primary caretaker. Patient to call clinic with any questions / concerns. Reviewed proper use and side effects of treatment(s) and/or medication(s) with patient and/or primary caretaker. AVS provided or is available on [email or web address]     Note is transcribed using voice recognition software. Inadvertent computerized transcription errors may be present.

## 2020-02-22 NOTE — TELEPHONE ENCOUNTER
Patient requesting a medication refill.   Medication:    busPIRone (BUSPAR) 5 MG tablet [[id number]]      Pharmacy: [facility] [address] [place] [address], [address]    Last office visit: 2/10/2020    Next office visit: Visit date not found

## 2020-02-24 NOTE — TELEPHONE ENCOUNTER
Visit date not found of last visit.   Future Appointments   Date Time Provider Kaleb Honeycutt   3/2/2020 10:30 AM Pillo Edwards MD [facility]   8/18/2020 10:00 AM Danial Fairbanks DO [facility]

## 2020-02-24 NOTE — TELEPHONE ENCOUNTER
1/8/2020 Last office visit  Future Appointments   Date Time Provider Kaleb Honeycutt   3/2/2020 10:30 AM Nickie Bernardo MD [facility]   8/18/2020 10:00 AM Rhonda Wilkerson DO [facility]

## 2020-02-28 NOTE — TELEPHONE ENCOUNTER
Pt needs a refill of the Busiprone 5MG tablet, pharmacy never got it. Please resend to pharmacy on file. Call daughter Aileen Park when ready.  Thanks

## 2020-02-28 NOTE — PROGRESS NOTES
02/28/20      Bailee Cisneros  See HPI for CC    HPI  Chief Complaint   Patient presents with    6 Month Follow-Up     Chronic health conditions follow up    Discuss Medications     buspar    Is having problems with itch he saw a dermatologist who recommended lotion and some moisturizing cream as well as taking care of him for skin cancer that he has but is itching is not getting better. He does take BuSpar for major depression anxiety and it is worse at night. Is a 5 mg 3 times a day. He also has known peripheral arterial disease which is stable and COPD and he is taking his inhalers as prescribed and tolerating them well. Major depression, bipolar Current Moderate Episode Of Major Depressive Disorder Without Prior Episode ([facility])            Vasc disease Pad (Peripheral Artery Disease) ([facility])            COPD Pulmonary Emphysema, Unspecified Emphysema Type ([facility])        Vitals:    02/28/20 1119   BP: 117/71   Site: Left Upper Arm   Position: Sitting   Cuff Size: Medium Adult   Pulse: 91   SpO2: 97%   Weight: 97 lb (44 kg)   Height: 5' 6\" (1.676 m)     Body mass index is 15.66 kg/m². Wt Readings from Last 3 Encounters:   02/28/20 97 lb (44 kg)   01/08/20 96 lb 12.8 oz (43.9 kg)   09/20/19 103 lb (46.7 kg)     BP Readings from Last 3 Encounters:   02/28/20 117/71   01/08/20 126/68   09/20/19 118/82              Past Medical History:   Diagnosis Date    Current moderate episode of major depressive disorder without [facility] episode [facility] 10/3/2018    Fractures     L1     Hyperlipidemia 10/11/2013    Pulmonary emphysema ([facility] 9/20/2019    Squamous cell carcinoma, ear      Social History     Socioeconomic History    Marital status:       Spouse name: Not on file    Number of children: Not on file    Years of education: Not on file    Highest education level: Not on file   Occupational History    Not on file   Social Needs    Financial resource strain: Not on file    Food insecurity:     Worry: Not on file     Inability: Not on file    Transportation needs:     Medical: Not on file     Non-medical: Not on file   Tobacco Use    Smoking status: Former Smoker     Last attempt to quit: 2000     Years since quittin.8    Smokeless tobacco: Never Used   Substance and Sexual Activity    Alcohol use: No    Drug use: Never    Sexual activity: Not Currently   Lifestyle    Physical activity:     Days per week: Not on file     Minutes per session: Not on file    Stress: Not on file   Relationships    Social connections:     Talks on phone: Not on file     Gets together: Not on file     Attends [employer] service: Not on file     Active member of club or organization: Not on file     Attends meetings of clubs or organizations: Not on file     Relationship status: Not on file    Intimate partner violence:     Fear of current or ex partner: Not on file     Emotionally abused: Not on file     Physically abused: Not on file     Forced sexual activity: Not on file   Other Topics Concern    Not on file   Social History Narrative    Not on file           Review of Systems   Constitutional: Negative for unexpected weight change. HENT: Negative. Eyes: Negative for redness. Respiratory: Negative for shortness of breath. Cardiovascular: Negative for chest pain and leg swelling. Gastrointestinal: Negative for constipation, diarrhea, nausea and vomiting. Skin: Negative for pallor. Itch   Allergic/Immunologic: Negative for immunocompromised state. Psychiatric/Behavioral: Negative for confusion. Itch plus anxiety with depression. All other systems reviewed and are negative.     Current Outpatient Medications   Medication Sig Dispense Refill    Latanoprostene Bunod (VYZULTA) 0.024 % SOLN Apply to eye      busPIRone (BUSPAR) 5 MG tablet Take 1 tablet by mouth 3 times daily as needed (anxiety) 90 tablet 0    triamcinolone (KENALOG) 0.1 % cream Apply to affected areas on legs BID for 2 weeks, then BID sparingly prn flares no more than 2 days per week 60 g 1    budesonide-formoterol (SYMBICORT) 80-4.5 MCG/ACT AERO INHALE 2 PUFFS INTO THE LUNGS TWICE DAILY 10.2 g 1    hydrochlorothiazide (HYDRODIURIL) 25 MG tablet TAKE 1 TABLET BY MOUTH EVERY MORNING 30 tablet 5    Spacer/Aero-Holding Chambers (E-Z SPACER) MANN 1 Device by Does not apply route daily as needed (inhaler use) 1 Device 0    Elastic Bandages & Supports (LUMBAR BACK BRACE/SUPPORT PAD) MISC 1 each by Does not apply route daily as needed (Elastic lumbar soft brace) 1 each 0    ALPHAGAN P 0.1 % SOLN INT ONE GTT IN OU BID  5    atorvastatin (LIPITOR) 10 MG tablet TAKE ONE TABLET BY MOUTH DAILY 90 tablet 3     No current facility-administered medications for this visit. Vitals:    02/28/20 1119   BP: 117/71   Pulse: 91   SpO2: 97%         Physical Exam  Physical Exam  Constitutional:       General: He is not in acute distress. Appearance: He is well-developed. He is not diaphoretic. HENT:      Head: Normocephalic and atraumatic. Eyes:      Conjunctiva/sclera: Conjunctivae normal.   Cardiovascular:      Rate and Rhythm: Normal rate and regular rhythm. Heart sounds: Normal heart sounds. Pulmonary:      Effort: Pulmonary effort is normal.      Breath sounds: Normal breath sounds. No stridor. No wheezing. Skin:     General: Skin is warm and dry. Coloration: Skin is not pale. Comments: Xerosis throughout. Neurological:      Mental Status: He is alert and oriented to person, place, and time. Coordination: Coordination normal.   Psychiatric:         Behavior: Behavior normal.         Thought Content: Thought content normal.         Judgment: Judgment normal.          Diagnosis Orders   1. Current moderate episode of major depressive disorder without prior episode ([address]     2. Pulmonary emphysema, unspecified emphysema type ([address]     3. PAD (peripheral artery disease) ([address]     4. Xerosis of skin     5. Pruritic condition       Nickie Foster was seen today for 6 month follow-up and discuss medications. Diagnoses and all orders for this visit:    Current moderate episode of major depressive disorder without prior episode ([address]  We can go ahead and keep the BuSpar the same right now and add Atarax which might help with his dryness and itching of the skin. If is not better we will increase the BuSpar from 5mg 3 times daily to 10 3 times daily. Pulmonary emphysema, unspecified emphysema type ([facility]    PAD (peripheral artery disease) ([facility])    Xerosis of skin    Pruritic condition    Other orders  -     hydrOXYzine (ATARAX) 25 MG tablet; Take 1 tablet by mouth nightly as needed for Itching        Ok to increase buspar next month if not improving with atarax. An electronic signature was used to authenticate this note. This was dictated. Errors mightbe possible due to dictation.   --Paula Vera, DO

## 2020-03-02 PROBLEM — C44.229 SQUAMOUS CELL CARCINOMA OF LEFT EAR: Status: ACTIVE | Noted: 2020-01-01

## 2020-03-02 NOTE — PROGRESS NOTES
MOHS PROCEDURE NOTE    PHYSICIAN:  Jori Leonardo. Justin Carnes MD    ASSISTANT: Suresh Dyer, SY, Caitlyn Farrar RN     REFERRING PROVIDER:  Shania Mejia D.O    PREOPERATIVE DIAGNOSIS: Invasive moderately-differentiated Squamous Cell Carcinoma     SPECIFIC MOHS INDICATIONS:  location    [facility] SCORIN/9    POSTOPERATIVE DIAGNOSIS: SAME    LOCATION: Posterior helix of left ear    OPERATIVE PROCEDURE:  MOHS MICROGRAPHIC SURGERY    RECONSTRUCTION OF DEFECT: Second Intention Wound Healing    PREOPERATIVE SIZE: 9x8 MM    DEFECT SIZE: 11x10 MM    LENGTH OF REPAIRED WOUND/SIZE OF FLAP/SIZE OF GRAFT:  n/a     ANESTHESIA:  1mL 1% lidocaine with epinephrine 1:100,000 buffered. EBL:  MINIMAL    DURATION OF PROCEDURE:  30 MINUTES    POSTOPERATIVE OBSERVATION: 30 MINUTES    SPECIMENS:  SEE MOHS MAP    COMPLICATIONS:  NONE    DESCRIPTION OF PROCEDURE:  The patient was given a mirror, as appropriate, and the biopsy site was identified, marked with a surgical marking pen, and verified by the patient. Options for treatment were discussed and the patient was informed that Mohs surgery was the selected treatment based on its lower recurrence rate, given the features listed above, as compared to other treatment modalities such as excision, radiation, or curettage, and agreed with this treatment plan. Risks and benefits including bruising, swelling, bleeding, infection, nerve injury, recurrence, and scarring were discussed with the patient prior to the procedure and a written consent detailing these and other risks was reviewed with the patient and signed. There was a time out for person and procedure verification. The surgical site was prepped with an antiseptic solution. Application of an antiseptic solution was repeated before each surgical stage. Stage I:  The clinically-apparent tumor was carefully defined and debulked, determining the edge of the surgical excision.     A thin layer of tumor-laden tissue was excised with a narrow margin of normal-appearing skin, using the technique of Mohs. A map was prepared to correspond to the area of skin from which it was excised. Hemostasis was achieved using electrosurgery. The wound was bandaged. The tissue was prepared for the cryostat and sectioned. 1 section(s) prepared. Each section was coded, cut, and stained for microscopic examination. The entire base and margins of the excised piece of tissue were examined by the surgeon. No tumor was identified at the peripheral margins of stage I of microscopically controlled surgery. DEFECT MANAGEMENT:    REPAIR DESCRIPTION:  After discussion with the patient regarding the various options, it was decided to allow the defect to heal by second intention (granulation). Healing time, wound care and scarring were discussed with the patient and he/she feels capable of taking care of the wound. WOUND COVERAGE:  The wound was cleaned with normal saline solution, dried off, Aquaphor ointment was applied, and the wound was covered. A dressing was applied for stabilization and light pressure. The patient was given detailed oral and written instructions on postoperative care. There were no complications. The patient left the Unit in good medical condition. FOLLOW-UP:  Follow up wound check in 1-3 weeks.

## 2020-03-03 NOTE — TELEPHONE ENCOUNTER
The patient was in the office on 3/2/20 for Mohs located on the posterior helix of left ear with second intention repair. The patient tolerated the procedure well and left the office in good condition. A post-operative telephone call was placed at [phone] in order to check on the patient's recovery process. The patient was not able to be reached but the patient's son in law said that the patient was doing fine and that he and his daughter, Ashley Wynn didn't have any concerns.

## 2020-03-18 NOTE — TELEPHONE ENCOUNTER
I tried to call as well from work and cell phone/ I informed the patient of this. Must be something wrong with the pharmacies phone. I informed the patients daughter of this.

## 2020-03-18 NOTE — TELEPHONE ENCOUNTER
Spoke to daughter regarding Violette Watts. She said his wound is doing okay-still sensitive and has a little bit of yellowish drainage on his bandage, no s/s of infection. I told her if need be, at any point if any concerns arise, she can send us a photo. She will await our call for rescheduling. All questions answered.

## 2020-03-31 NOTE — TELEPHONE ENCOUNTER
Spoke to daughter regarding Jimenez's wound. She said it is looking great and thinks that the site is almost healed. She doesn't feel she will need a follow up appointment but will call if anything changes or feels it necessary to come in. All questions answered.

## 2020-07-06 NOTE — TELEPHONE ENCOUNTER
[facility] received a call from:     Name of Caller: BRISA    Relationship to patient: DAUGHTER    Organization name: N/A    Best contact number: [phone]    Reason for call: DAUGHTER SAYS THAT PT [facility]. HE HAS BEEN FOR A FEW DAYS. HE DOES NOT EAT WELL.  SHE IS VERY EMOTIONALLY CONCERNED  PLEASE ADVISE

## 2020-07-06 NOTE — TELEPHONE ENCOUNTER
Pt's daughter called back in to check the status of the message earlier today. Please call daughter back.   # [phone]

## 2020-07-08 PROBLEM — N30.00 ACUTE CYSTITIS WITHOUT HEMATURIA: Status: ACTIVE | Noted: 2020-01-01

## 2020-07-08 PROBLEM — R63.0 POOR APPETITE: Status: ACTIVE | Noted: 2020-01-01

## 2020-07-08 PROBLEM — R63.6 UNDERWEIGHT: Status: ACTIVE | Noted: 2020-01-01

## 2020-07-08 PROBLEM — R53.1 WEAKNESS: Status: ACTIVE | Noted: 2020-01-01

## 2020-07-08 NOTE — PROGRESS NOTES
legs BID for 2 weeks, then BID sparingly prn flares no more than 2 days per week Yes Dawn Barrera, DO   Spacer/Aero-Holding Chambers (E-Z SPACER) MANN 1 Device by Does not apply route daily as needed (inhaler use) Yes Jesus Beltrán, DO   Elastic Bandages & Supports (LUMBAR BACK BRACE/SUPPORT PAD) MISC 1 each by Does not apply route daily as needed (Elastic lumbar soft brace) Yes Wendelyn Mortimer, MD   ALPHAGAN P 0.1 % SOLN INT ONE GTT IN OU BID Yes Historical Provider, MD        Social History     Tobacco Use    Smoking status: Former Smoker     Last attempt to quit: 2000     Years since quittin.1    Smokeless tobacco: Never Used   Substance Use Topics    Alcohol use: No        Vitals:    20 1345   BP: 114/72   Site: Left Upper Arm   Position: Sitting   Cuff Size: Medium Adult   Pulse: 96   Temp: 98.7 °F (37.1 °C)   SpO2: 98%   Weight: 95 lb (43.1 kg)   Height: 5' 6\" (1.676 m)     Estimated body mass index is 15.33 kg/m² as calculated from the following:    Height as of this encounter: 5' 6\" (1.676 m). Weight as of this encounter: 95 lb (43.1 kg). Physical Exam  Vitals signs and nursing note reviewed. Constitutional:       General: He is not in acute distress. Appearance: He is well-developed and underweight. He is not ill-appearing, toxic-appearing or diaphoretic. HENT:      Head: Normocephalic and atraumatic. Eyes:      General: No scleral icterus. Right eye: No discharge. Left eye: No discharge. Conjunctiva/sclera: Conjunctivae normal.      Pupils: Pupils are equal, round, and reactive to light. Cardiovascular:      Rate and Rhythm: Normal rate and regular rhythm. Heart sounds: Normal heart sounds. No murmur. No friction rub. No gallop. Pulmonary:      Effort: Pulmonary effort is normal. No respiratory distress. Breath sounds: Normal breath sounds. No stridor. No wheezing, rhonchi or rales.    Lymphadenopathy:      Cervical: No cervical adenopathy. Skin:     General: Skin is warm and dry. Coloration: Skin is not jaundiced or pale. Neurological:      General: No focal deficit present. Mental Status: He is alert and oriented to person, place, and time. Mental status is at baseline. Cranial Nerves: No cranial nerve deficit. Results for Orlando Melgar (MRN <[id number]>) as of 7/8/2020 15:20   Ref. Range 7/8/2020 14:15   Protein, UA Unknown 30   Glucose, UA POC Unknown neg   Bilirubin, UA Unknown small   Ketones, UA Unknown neg   Spec Grav, UA Unknown 1.020   pH, UA Unknown 7.0   Urobilinogen, UA Unknown 4.0   Nitrite, UA Unknown neg   Leukocytes, UA Unknown large   Blood, UA POC Unknown small     ASSESSMENT/PLAN:  1. Acute cystitis without hematuria  - POCT Urinalysis No Micro (Auto)  - Culture, Urine  - ciprofloxacin (CIPRO) 250 MG tablet; Take 1 tablet by mouth 2 times daily for 3 days  Dispense: 6 tablet; Refill: 0    2. Weakness  - BASIC METABOLIC PANEL  - CBC    3. Poor appetite    4. Underweight    Possible uti- will start Cipro 250 mg BID x 3 days and send urine culture  Discussed with patient importance of adequate hydration. Try to drink 64 ounces of water daily  Poor appetite-- he was given samples of Ensure Enlive-- drink one three times daily  Reduce HCTZ dose to every other day  Send labs  Follow up in one week    Return if symptoms worsen or fail to improve. An electronic signature was used to authenticate this note.     --CROW Smith - CNP on 7/8/2020 at 2:24 PM

## 2020-07-08 NOTE — PATIENT INSTRUCTIONS
Drink 64 ounces of water daily. Drink supplement drinks (eg. Ensure) daily. Start antibiotic. Please take the full course, even if you start to feel better. Take the diuretic pill every other day.

## 2020-08-10 NOTE — TELEPHONE ENCOUNTER
----- Message from Annetta Carrillo sent at 8/10/2020  8:36 AM EDT -----  Subject: Message to Provider    QUESTIONS  Information for Provider? Daughter Alva Or would like Huyen or Dr Prashanth Astudillo   to call her back to discuss pt's condition today  ---------------------------------------------------------------------------  --------------  CALL BACK INFO  What is the best way for the office to contact you? OK to leave message on   voicemail  Preferred Call Back Phone Number? [phone]  ---------------------------------------------------------------------------  --------------  SCRIPT ANSWERS  Relationship to Patient? Other  Representative Name? Alva Or daughter  Is the Representative on the appropriate HIPAA document in Epic?  Yes

## 2020-08-11 NOTE — TELEPHONE ENCOUNTER
----- Message from Bree Mora sent at 8/11/2020  9:17 AM EDT -----  Subject: Appointment Request    Reason for Call: Urgent (Patient Request) No Script    QUESTIONS  Type of Appointment? Established Patient  Reason for appointment request? Available appointments did not meet   patient need  Additional Information for Provider? pt does have access to a Smart phone   /computer/etc for VV and neither does evangelina Petit . they could do a telephone if possible but not a VV and there are   no In person visit and  told pt to make appt based upon difficulty   breathing/weakness symptoms the pt is having   ---------------------------------------------------------------------------  --------------  CALL BACK INFO  What is the best way for the office to contact you? OK to leave message on   voicemail  Preferred Call Back Phone Number? [phone]  ---------------------------------------------------------------------------  --------------  SCRIPT ANSWERS  Relationship to Patient? Self  Appointment reason? Symptomatic  Select script based on patient symptoms? Adult No Script  (Patient requests to see the provider urgently  today or tomorrow. )? Yes   (Is the patient requesting to see the provider for a procedure?)? No  (Is the patient requesting to be seen routinely (not today or tomorrow)? No  Have you been diagnosed with   tested for   or told that you are suspected of having COVID-19 (Coronavirus)? No  Have you had a fever or taken medication to treat a fever within the past   3 days? No  Have you had a cough   shortness of breath or flu-like symptoms within the past 3 days? No  Do you currently have flu-like symptoms including fever or chills   cough   shortness of breath   or difficulty breathing   or new loss of taste or smell?  Yes

## 2020-08-14 NOTE — PROGRESS NOTES
08/14/20      Edgar Seng  See HPI for CC    HPI  Chief Complaint   Patient presents with    Shortness of Breath     worsening - may need to be put on Oxygen per family    Abdominal Pain     muscles hurt trying to breathe   Daughter is concerned that she thinks he might be having problems breathing because of his known history of pulmonary fibrosis versus COPD emphysema. usinghis inhaler as directed twice a day with 2 puffs. He might not be totally getting it all in he has been giving the chamber to help with the inhalation but he has not used it yet. Says sometimes she walks into the room and is very slouched over and not sitting up straight and she is wondering if he is getting enough air. Vitals:    08/14/20 0814   BP: 100/86   Site: Left Upper Arm   Position: Sitting   Pulse: 108   Resp: 16   Temp: 98.6 °F (37 °C)   TempSrc: Temporal   SpO2: 96%   Weight: 91 lb 3.2 oz (41.4 kg)     Body mass index is 14.72 kg/m². Wt Readings from Last 3 Encounters:   08/14/20 91 lb 3.2 oz (41.4 kg)   07/08/20 95 lb (43.1 kg)   03/02/20 98 lb (44.5 kg)     BP Readings from Last 3 Encounters:   08/14/20 100/86   07/08/20 114/72   03/02/20 101/68            Past Medical History:   Diagnosis Date    Current moderate episode of major depressive disorder without prior episode ([facility] [address] 10/3/2018    Fractures     L1     Hyperlipidemia 10/11/2013    [facility]    Squamous cell carcinoma, ear      Social History     Socioeconomic History    Marital status:       Spouse name: Not on file    Number of children: Not on file    Years of education: Not on file    Highest education level: Not on file   Occupational History    Not on file   Social Needs    Financial resource strain: Not on file    Food insecurity     Worry: Not on file     Inability: Not on file    Transportation needs     Medical: Not on file     Non-medical: Not on file   Tobacco Use    Smoking status: Former Smoker Last attempt to quit: 2000     Years since quittin.2    Smokeless tobacco: Never Used   Substance and Sexual Activity    Alcohol use: No    Drug use: Never    Sexual activity: Not Currently   Lifestyle    Physical activity     Days per week: Not on file     Minutes per session: Not on file    Stress: Not on file   Relationships    Social connections     Talks on phone: Not on file     Gets together: Not on file     Attends Gnosticist service: Not on file     Active member of club or organization: Not on file     Attends meetings of clubs or organizations: Not on file     Relationship status: Not on file    Intimate partner violence     Fear of current or ex partner: Not on file     Emotionally abused: Not on file     Physically abused: Not on file     Forced sexual activity: Not on file   Other Topics Concern    Not on file   Social History Narrative    Not on file           Review of Systems   Constitutional: Negative for unexpected weight change. HENT: Negative. Eyes: Negative for redness. Respiratory: Positive for shortness of breath. Negative for apnea, cough, choking, chest tightness, wheezing and stridor. Per daughter, pt does not feel sob   Cardiovascular: Negative for chest pain and leg swelling. Gastrointestinal: Negative for constipation, diarrhea, nausea and vomiting. Skin: Negative for pallor. Allergic/Immunologic: Negative for immunocompromised state. Psychiatric/Behavioral: Negative for confusion. All other systems reviewed and are negative.     Current Outpatient Medications   Medication Sig Dispense Refill    hydrOXYzine (ATARAX) 25 MG tablet TK 1 T PO NIGHTLY PRF ITCHING      hydroCHLOROthiazide (HYDRODIURIL) 25 MG tablet Take 1 tablet by mouth every other day 90 tablet 3    busPIRone (BUSPAR) 5 MG tablet TAKE ONE TABLET BY MOUTH THREE TIMES DAILY AS NEEDED FOR ANXIETY 90 tablet 3    atorvastatin (LIPITOR) 10 MG tablet TAKE ONE TABLET BY MOUTH DAILY 90 tablet 3    SYMBICORT 80-4.5 MCG/ACT AERO INHALE 2 PUFFS INTO THE LUNGS TWICE DAILY 10.2 g 5    Latanoprostene Bunod (VYZULTA) 0.024 % SOLN Apply to eye      triamcinolone (KENALOG) 0.1 % cream Apply to affected areas on legs BID for 2 weeks, then BID sparingly prn flares no more than 2 days per week 60 g 1    Spacer/Aero-Holding Chambers (E-Z SPACER) MANN 1 Device by Does not apply route daily as needed (inhaler use) 1 Device 0    Elastic Bandages & Supports (LUMBAR BACK BRACE/SUPPORT PAD) MISC 1 each by Does not apply route daily as needed (Elastic lumbar soft brace) 1 each 0    ALPHAGAN P 0.1 % SOLN INT ONE GTT IN OU BID  5     No current facility-administered medications for this visit. Vitals:    08/14/20 0814   BP: 100/86   Pulse: 108   Resp: 16   Temp: 98.6 °F (37 °C)   SpO2: 96%         Physical Exam  Physical Exam  Constitutional:       General: He is not in acute distress. Appearance: He is well-developed. He is not diaphoretic. HENT:      Head: Normocephalic and atraumatic. Eyes:      Conjunctiva/sclera: Conjunctivae normal.   Cardiovascular:      Rate and Rhythm: Normal rate and regular rhythm. Heart sounds: Normal heart sounds. Pulmonary:      Effort: Pulmonary effort is normal. No respiratory distress. Breath sounds: Normal breath sounds. No stridor. No wheezing, rhonchi or rales. Comments: Advanced kyphosis  Chest:      Chest wall: No tenderness. Skin:     General: Skin is warm and dry. Coloration: Skin is not pale. Neurological:      Mental Status: He is alert and oriented to person, place, and time. Coordination: Coordination normal.   Psychiatric:         Behavior: Behavior normal.         Thought Content: Thought content normal.         Judgment: Judgment normal.          Diagnosis Orders   1. Pulmonary fibrosis [facility])  Aurelia Parsons MD, Pulmonary, [facility]   2. Recent urinary tract infection  POCT Urinalysis no Micro   3.  Pulmonary

## 2020-08-18 NOTE — PROGRESS NOTES
[facility]) Dermatology  [facility] , [address]       Arcadio Berman  1931    80 y.o. male     Date of Visit: 2020    Chief Complaint:   Chief Complaint   Patient presents with    Skin Exam     tbse, moles        I was asked to see this patient by Dr. Joaquin ref. provider found. History of Present Illness: Arcadio Berman is a 80 y.o. male who presents with the chief complaint of the followin. Total body skin cancer screening exam.  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 Last visit 2020. Patient presents with daughter for visit today, he is hard of hearing. 2.Many year history of multiple nevi on the head/neck, trunk and extremities, all present for many years. Denies new moles. Denies moles changing in size, shape, color. None associated w/ pain, bleeding, pruritus. 3.  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 Since last visit, he started to use CeraVe moisturizing cream daily as needed. He will occasionally apply topical triamcinolone 0.1% cream sparingly for itch, has not needed to use in over 2 months. He occasionally takes a nondrowsy over-the-counter antihistamine as needed itch which he has not needed to use in over 1 month. States his itching has significantly decreased and daughter states that he has not complained of itching to his torso for over 3 months. 4.  Pink gritty scaled macules to right lateral cheek, patient unaware. Admits to sun exposure in youth without wearing sunscreen, hats, or protective clothing. Currently practices sun avoidance. Review of Systems:  Constitutional: Reports general sense of well-being   Skin: No new or changing moles, no tendency to develop thick scars, no interval of severe sunburns  Heme: No abnormal bruising or bleeding.     Past Medical History, Family History, Surgical History, Medications and Allergies reviewed. Past Skin Hx:  -2/2020-history of moderately differentiated SCC to posterior helix of left ear status post Mohs surgery by Dr. Shirley Baker in 3/2020.  -History of actinic keratoses s/p cryotherapy   -4/2019- shave bx to posterior helix of left ear shows squamous acanthoma, no koilocytes but could consider verruca. -History of xerosis cutis-triamcinolone 0.1% cream twice daily for up to 2 weeks as needed flares, no evidence of black mold on exam as patient was concerned. Dry skin care regimen discussed. Patient denies past history of melanoma, NMSC, dysplastic nevi     PFHx: melanoma in son    Family History   Problem Relation Age of Onset    Cancer Other         Melanoma     Other Neg Hx      Past Medical History:   Diagnosis Date    Current moderate episode of major depressive disorder without prior episode ([facility] [address] 10/3/2018    Fractures     L1     Hyperlipidemia 10/11/2013    Pulmonary emphysema ([facility] [address] [facility] 9/20/2019    Squamous cell carcinoma, ear      Past Surgical History:   Procedure Laterality Date    BACK SURGERY      OTHER SURGICAL HISTORY      ulcer rupture.        Allergies   Allergen Reactions    Diclofenac Rash    Tramadol Nausea And Vomiting     Outpatient Medications Marked as Taking for the 8/18/20 encounter (Office Visit) with Erica Amador, DO   Medication Sig Dispense Refill    hydrOXYzine (ATARAX) 25 MG tablet TK 1 T PO NIGHTLY PRF ITCHING      budesonide-formoterol (SYMBICORT) 160-4.5 MCG/ACT AERO Inhale 2 puffs into the lungs 2 times daily 1 Inhaler 3    hydroCHLOROthiazide (HYDRODIURIL) 25 MG tablet Take 1 tablet by mouth every other day 90 tablet 3    busPIRone (BUSPAR) 5 MG tablet TAKE ONE TABLET BY MOUTH THREE TIMES DAILY AS NEEDED FOR ANXIETY 90 tablet 3    atorvastatin (LIPITOR) 10 MG tablet TAKE ONE TABLET BY MOUTH DAILY 90 tablet 3    Latanoprostene Bunod (VYZULTA) 0.024 % SOLN Apply to eye      triamcinolone (KENALOG) 0.1 % cream Apply to affected areas on legs BID for 2 weeks, then BID sparingly prn flares no more than 2 days per week 60 g 1    Spacer/Aero-Holding Chambers (E-Z SPACER) MANN 1 Device by Does not apply route daily as needed (inhaler use) 1 Device 0    Elastic Bandages & Supports (LUMBAR BACK BRACE/SUPPORT PAD) MISC 1 each by Does not apply route daily as needed (Elastic lumbar soft brace) 1 each 0    ALPHAGAN P 0.1 % SOLN INT ONE GTT IN OU BID  5       Social History:   Social History     Socioeconomic History    Marital status:       Spouse name: Not on file    Number of children: Not on file    Years of education: Not on file    Highest education level: Not on file   Occupational History    Not on file   Social Needs    Financial resource strain: Not on file    Food insecurity     Worry: Not on file     Inability: Not on file    Transportation needs     Medical: Not on file     Non-medical: Not on file   Tobacco Use    Smoking status: Former Smoker     Last attempt to quit: 2000     Years since quittin.2    Smokeless tobacco: Never Used   Substance and Sexual Activity    Alcohol use: No    Drug use: Never    Sexual activity: Not Currently   Lifestyle    Physical activity     Days per week: Not on file     Minutes per session: Not on file    Stress: Not on file   Relationships    Social connections     Talks on phone: Not on file     Gets together: Not on file     Attends Jewish service: Not on file     Active member of club or organization: Not on file     Attends meetings of clubs or organizations: Not on file     Relationship status: Not on file    Intimate partner violence     Fear of current or ex partner: Not on file     Emotionally abused: Not on file     Physically abused: Not on file     Forced sexual activity: Not on file   Other Topics Concern    Not on file   Social History Narrative    Not on file       Physical Examination     The following were examined and determined to be normal: Psych/Neuro, Scalp/hair, Conjunctivae/eyelids, Gums/teeth/lips, Neck, Breast/axilla/chest, Abdomen, Back, RUE, LUE, RLE, LLE and Nails/digits. Pt declined to remove hearing aids or socks. Areas covered by underwear garment(s), socks, hearing aids not examined. The following were examined and determined to be abnormal: Head/face. Bird phototype: 2    -Constitutional: Well appearing, no acute distress  -Neurological: Alert and oriented X 3  -Mood and Affect: Pleasant  Total body skin exam performed, areas examined listed above:   1. ill defined irreg shaped gritty keratotic pink macule(s) located to right lateral cheek (2)  2. Scattered on the head,neck, trunk and extremities are multiple well-defined round and oval symmetric smoothly-bordered uniformly brown macules and papules. no change in size/shape/color of any lesions; no bleeding lesions. 3. Back, chest- well moisturized skin, minimal excoriations noted. 4. Posterior helix of left ear-well-healed scar, clear, no palpable nodules to postauricular lymph nodes  Assessment and Plan     1. Actinic keratoses    2. Multiple benign melanocytic nevi    3. Xerosis cutis    4. History of squamous cell carcinoma        1. Actinic keratoses  -Edu re: relationship with chronic cumulative sun exposure, low premalignant potential.   Verbal consent obtained. -Right lateral cheek, 2 lesion(s) treated w/ liquid nitrogen x 1cycles, 3 seconds each located    Edu re: risk of blister formation, discomfort, scar, dyspigmentation. Discussed wound care. -Reviewed sun protective behavior -- sun avoidance during the peak hours of the day, sun-protective clothing (including hat and sunglasses), sunscreen use (water resistant, broad spectrum, SPF at least 30, need for reapplication every 2 to 3 hours).    -Patient to contact office if AK fails to resolve despite treatment or concern for recurrence (discussed signs/symptoms to monitor for) or if patient develops side effect from therapy, such as unbearable blister, crusting, scabbing, redness, or tenderness. 2. Multiple benign melanocytic nevi  Benign acquired melanocytic nevi  -Recommend monthly self skin exams   -Educated regarding the ABCDEs of melanoma detection   -Call for any new/changing moles or concerning lesions  -Reviewed sun protective behavior -- sun avoidance during the peak hours of the day, sun-protective clothing (including hat and sunglasses), sunscreen use (water resistant, broad spectrum, SPF at least 30, need for reapplication every 1.5 to 2 hours), avoidance of tanning beds   -Plan: Observation with annual skin checks (earlier if indicated) performed in office to monitor current nevi and to assess for new lesions. 3. Xerosis cutis  Reviewed dry skin care regimen, xerosis has significantly improved and pruritus has significantly decreased since implementing recommendations  -May use triamcinolone 0.1% cream sparingly for up to 2 weeks as needed flares  Reviewed proper use and side effects/contraindications  -May continue OTC nondrowsy antihistamine daily as needed for itch as needed flares    4. History of squamous cell carcinoma  No evidence of recurrence   Skin Care:  Skin cancer is primarily a result of exposure to UV light. Patients with a history of non-melanoma skin cancer should wear sunscreen, sun protective clothing, wide-brimmed hats and practice sun avoidance as much as possible to decrease their risk of developing new skin cancers. Expectations:  Scars from where skin cancers have been removed should be monitored for recurrences. Contact office:  If the patient notices discoloration, bleeding, or a bump arising in a previously healed scar or if a new lesion develops elsewhere. Return to Clinic: 1 year skin exam (pt and pt's daughter decline 6 month skin exam)  Discussed plan with patient and/or primary caretaker. Patient to call clinic with any questions / concerns. Reviewed proper use and side effects of treatment(s) and/or medication(s) with patient and/or primary caretaker. AVS provided or is available on [facility]     Note is transcribed using voice recognition software. Inadvertent computerized transcription errors may be present.

## 2020-08-18 NOTE — PATIENT INSTRUCTIONS
to dry it out with rubbing alcohol or hydrogen peroxide.  Continue this regimen until the area is pink and healed. Depending on the size and location of your cryosurgery site, healing may take 2 to 4 weeks.  The area may continue to be pink for several weeks, and over the next few months may become darker or lighter than the surrounding skin. This may be a permanent change. Thanks for your visit! Feel free to call Dr. Diana Perez assistantSandra if you have questions, concerns or to schedule your cosmetic procedures.

## 2020-09-17 NOTE — PROGRESS NOTES
Pulmonary Outpatient Note   Alexei Johnson MD       9/17/2020    1. ILD (interstitial lung disease) ([facility]    2. Chronic obstructive pulmonary disease, unspecified COPD type ([facility]    3. Kyphoscoliosis    4. Weight loss    5. Former smoker          ASSESSMENT/PLAN:  ILD. CT findings from September 2019 suggest possible IPF, recommend repeat CT chest.  He also has an elevated left hemidiaphragm. COPD. Significant smoking history, there appears to be more of air trapping, some paraseptal emphysema. Await results of follow-up CT. Kyphoscoliosis. Likely contributing to his restrictive defect, await results of PFT and 6-minute walk test.    Weight loss. Patient with pulmonary fibrosis with weight loss usually have a bad prognosis, underlying malignancy also a concern. Former smoker. Await result on CT. Strong family history of lung cancer. Due to his weight loss, repeat CT is recommended. Prophylaxis. Continue her flu shots annually, up-to-date with his pneumonia vaccines. RTC with testing. Orders Placed This Encounter   Procedures    CT CHEST WO CONTRAST    Full PFT Study With Bronchodilator    6 Minute Walk Test       No follow-ups on file. Chief Complaint:   New Patient (R/B Dr. Jose A Duffy SOB Pulm Fibrosis)       HPI: Adonay Bolanos is a very pleasant 80y.o. year old male here for evaluation and management of pulmonary fibrosis, referred by his PCP Dr. Odette Limon. He is accompanied by his daughter. Kaushal Zamora is a very pleasant 77-year-old male who lives alternately between one of his two daughters. He lost his wife a year and a half ago from heart disease. The patient was a smoker, smoked since age 8, quit about 20 years ago. 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 He was not in the services. To his knowledge he was not exposed to asbestos.     The patient has a history of hypertension,, hyperlipidemia, depression, kyphosis, falls with osteoporosis and vertebroplasty. The patient has known emphysema and pulmonary fibrosis. The patient has had increasing shortness of breath and increased cough with expectoration. There is no nocturnal cough. He has spells of \"weakness\" for the last year. He has significantly lost weight, from 1  down to 91 pounds. He has reduced appetite. He is not sure he has wheezing. He does have constipation, uses laxatives. He uses mineral oil almost every night. He has had decreased water intake. He also has headaches. He is out of breath walking 1 room to another. CXR 2019    In this patient with COPD, increasing perihilar opacities may represent    vascular congestion or perhaps underlying lymph node enlargement. CT chest 9/10/2019    Underlying emphysema.  A few superimposed subpleural irregular opacities are    seen, most pronounced in the apices, compatible with scarring-nonspecific    pulmonary fibrosis.  Appearance is similar.  No focal lung consolidation noted         Stable punctate subcentimeter noncalcified nodular densities left upper lobe    and left lower lobe         Incompletely healed compression fracture L1.           There is no PFT or echocardiogram in Epic    Past Medical History:   Diagnosis Date    Current moderate episode of major depressive disorder without prior episode ([address] 10/3/2018    Fractures     L1     Hyperlipidemia 10/11/2013    Pulmonary emphysema ([address] 2019    Squamous cell carcinoma, ear        Past Surgical History:   Procedure Laterality Date    BACK SURGERY      OTHER SURGICAL HISTORY      ulcer rupture.        Social History     Tobacco Use    Smoking status: Former Smoker     Packs/day: 1.00     Types: Cigarettes     Start date: 1941     Last attempt to quit: 2000     Years since quittin.3    Smokeless tobacco: Never Used   Substance Use Topics    Alcohol use: No       Family History Problem Relation Age of Onset    Cancer Other         Melanoma     Other Neg Hx          Current Outpatient Medications:     hydrOXYzine (ATARAX) 25 MG tablet, TK 1 T PO NIGHTLY PRF ITCHING, Disp: , Rfl:     budesonide-formoterol (SYMBICORT) 160-4.5 MCG/ACT AERO, Inhale 2 puffs into the lungs 2 times daily, Disp: 1 Inhaler, Rfl: 3    hydroCHLOROthiazide (HYDRODIURIL) 25 MG tablet, Take 1 tablet by mouth every other day, Disp: 90 tablet, Rfl: 3    busPIRone (BUSPAR) 5 MG tablet, TAKE ONE TABLET BY MOUTH THREE TIMES DAILY AS NEEDED FOR ANXIETY, Disp: 90 tablet, Rfl: 3    atorvastatin (LIPITOR) 10 MG tablet, TAKE ONE TABLET BY MOUTH DAILY, Disp: 90 tablet, Rfl: 3    Latanoprostene Bunod (VYZULTA) 0.024 % SOLN, Apply to eye, Disp: , Rfl:     triamcinolone (KENALOG) 0.1 % cream, Apply to affected areas on legs BID for 2 weeks, then BID sparingly prn flares no more than 2 days per week, Disp: 60 g, Rfl: 1    ALPHAGAN P 0.1 % SOLN, INT ONE GTT IN OU BID, Disp: , Rfl: 5    Spacer/Aero-Holding Chambers (E-Z SPACER) MANN, 1 Device by Does not apply route daily as needed (inhaler use), Disp: 1 Device, Rfl: 0    Elastic Bandages & Supports (LUMBAR BACK BRACE/SUPPORT PAD) MISC, 1 each by Does not apply route daily as needed (Elastic lumbar soft brace), Disp: 1 each, Rfl: 0    Diclofenac and Tramadol    Vitals:    09/17/20 1452   BP: 128/85   Pulse: 105   Temp: 97.7 °F (36.5 °C)   TempSrc: Oral   SpO2: 98%   Weight: 88 lb (39.9 kg)   Height: 5' 6\" (1.676 m)       Review of Systems   Constitutional: Positive for activity change, appetite change and unexpected weight change. HENT: Positive for hearing loss. Respiratory: Positive for cough and shortness of breath. Gastrointestinal: Positive for constipation. Musculoskeletal: Positive for back pain and gait problem. All other systems reviewed and are negative. Physical Exam  Vitals signs reviewed.    Constitutional:       General: He is not in acute distress. Appearance: He is not ill-appearing or toxic-appearing. Comments: Very pleasant, underweight, stooped, [place]   HENT:      Head: Normocephalic and atraumatic. Comments: Bitemporal muscle wasting     Ears:      Comments: [place], bilateral hearing aids     Nose: Nose normal. No congestion or rhinorrhea. Mouth/Throat:      Mouth: Mucous membranes are moist.      Pharynx: Oropharynx is clear. Eyes:      General: No scleral icterus. Right eye: No discharge. Left eye: No discharge. Extraocular Movements: Extraocular movements intact. Conjunctiva/sclera: Conjunctivae normal.      Pupils: Pupils are equal, round, and reactive to light. Neck:      Musculoskeletal: No neck rigidity or muscular tenderness. Cardiovascular:      Rate and Rhythm: Tachycardia present. Pulses: Normal pulses. Heart sounds: No murmur. No friction rub. No gallop. Comments: Sinus tachycardia  Pulmonary:      Effort: Pulmonary effort is normal. No respiratory distress. Breath sounds: Rales (Few scattered) present. No wheezing. Abdominal:      General: There is no distension. Palpations: Abdomen is soft. Tenderness: There is no abdominal tenderness. There is no guarding or rebound. Musculoskeletal:         General: Deformity present. Right lower leg: No edema. Left lower leg: No edema. Comments: Significant kyphosis   Lymphadenopathy:      Cervical: No cervical adenopathy. Skin:     General: Skin is warm and dry. Coloration: Skin is not jaundiced or pale. Findings: No bruising, erythema or lesion. Comments: Clubbing of nails   Neurological:      General: No focal deficit present. Mental Status: He is alert and oriented to person, place, and time.       Gait: Gait normal.   Psychiatric:         Mood and Affect: Mood normal.         Behavior: Behavior normal.

## 2020-09-17 NOTE — PROGRESS NOTES
MA Communication:   The following orders are received by verbal communication from Kiet Murphy MD    Orders include:  PFT 6 min walk       CT Chest       FU in Office

## 2020-09-17 NOTE — PATIENT INSTRUCTIONS
Remember to bring all pulmonary medications to your next appointment with the office. Please keep all of your future appointments scheduled by [facility]. Out of respect for other patients and providers, you may be asked to reschedule your appointment if you arrive later than your scheduled appointment time. Appointments cancelled less than 24hrs in advance will be considered a no show. Patients with three missed appointments within 1 year or four missed appointments within 2 years can be dismissed from the practice. You may receive a survey regarding the care you received during your visit. Your input is valuable to us. We encourage you to complete and return your survey. We hope you will choose us in the future for your healthcare needs.

## 2020-09-17 NOTE — LETTER
[address] [facility]  [id number] [place] [id number]  Phone: [phone]  Fax: [phone]    Norvel Cockayne, MD        September 17, 2020     Ritesh Viveros DO  [address]    Patient: Emerita Harris  MR Number: <[id number]>  YOB: 1931  Date of Visit: 9/17/2020    Dear Dr. Ritesh Viveros: Thank you for the request for consultation for Katie Carpenter to me for the evaluation of ILD. Below are the relevant portions of my assessment and plan of care. If you have questions, please do not hesitate to call me. I look forward to following Tucker Siemens along with you.     Sincerely,        Norvel Cockayne, MD

## 2020-09-28 NOTE — PROGRESS NOTES
Physical Therapy    Facility/Department: [facility]  ED  Initial Assessment    NAME: Jordan Olmos  : 1931  MRN: [id number]    Date of Service: 2020    Discharge Recommendations:  24 hour supervision or assist, Home with Home health PT   PT Equipment Recommendations  Equipment Needed: Yes  Mobility Devices: Nevelyn Single: Rolling  If pt is unable to be seen after this session, please let this note serve as discharge summary. Please see case management note for discharge disposition. Thank you. Assessment   Body structures, Functions, Activity limitations: Decreased functional mobility ; Increased pain;Decreased endurance  Assessment: Pt functioning below baseline after a fall at home resulting in a pelvic fx. Pt very [place] but oriented and appropriate with questions. Pt was able to demonstrate safe transfers and ambulation wtih support of RW. Pt reported pain but did not favor L LE during ambulation. Pt fatigued quickly with SOB and returned to bed. Recommend home with 24 hr A and HHPT. Will need RW for home. Treatment Diagnosis: decreased mobility  Prognosis: Excellent  Decision Making: Medium Complexity  PT Education: Goals; General Safety;Gait Training;Disease Specific Education;PT Role;Plan of Care;Precautions;Transfer Training;Weight-bearing Education  Patient Education: Pt expresssed understanding but very [place] and needs reinforcement  Barriers to Learning: [place]  REQUIRES PT FOLLOW UP: Yes  Activity Tolerance  Activity Tolerance: Patient Tolerated treatment well;Patient limited by fatigue;Patient limited by pain       Patient Diagnosis(es): There were no encounter diagnoses.      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   has a past surgical history that includes back surgery and other surgical history. Restrictions  Restrictions/Precautions  Restrictions/Precautions: Fall Risk, Weight Bearing  Lower Extremity Weight Bearing Restrictions  Right Lower Extremity Weight Bearing: Weight Bearing As Tolerated  Left Lower Extremity Weight Bearing: Weight Bearing As Tolerated  Vision/Hearing  Vision: Within Functional Limits  Hearing: Exceptions to [facility]  Hearing Exceptions: Hard of hearing/hearing concerns;Bilateral hearing aid     Subjective  General  Chart Reviewed: Yes  Patient assessed for rehabilitation services?: Yes  Response To Previous Treatment: Not applicable  Family / Caregiver Present: Yes  Referring Practitioner: Diya Norman MD  Referral Date : 09/28/20  Diagnosis: fall, pelvic fx  Follows Commands: Within Functional Limits  General Comment  Comments: cleared by nursing  Subjective  Subjective: Pt resting in the ED.  REports \"all over pain\"  Pain Screening  Patient Currently in Pain: Yes          Orientation  Orientation  Overall Orientation Status: Within Functional Limits  Social/Functional History  Social/Functional History  Lives With: Daughter  Type of Home: Trailer  Home Layout: One level  Home Access: Level entry  Bathroom Shower/Tub: Tub/Shower unit  Home Equipment: Cane  ADL Assistance: Independent  Homemaking Assistance: Independent  Ambulation Assistance: Independent  Transfer Assistance: Independent  Active : No  Occupation: Retired  Additional Comments: patient's daughter states that patient has access to 24 hour supervision/assist  Cognition        Objective          PROM RLE (degrees)  RLE PROM: WFL  AROM RLE (degrees)  RLE AROM: WFL  PROM LLE (degrees)  LLE PROM: WFL  AROM LLE (degrees)  LLE AROM : WFL  Strength RLE  Strength RLE: WFL  Strength LLE  Strength LLE: WFL  Tone RLE  RLE Tone: Normotonic  Tone LLE  LLE Tone: Normotonic  Sensation  Overall Sensation Status: WFL  Bed mobility  Supine to Sit: Stand by assistance  Sit to Supine: Stand by assistance  Transfers  Sit to Stand: Stand by assistance  Stand to sit: Stand by assistance  Ambulation  Ambulation?: Yes  WB Status: WBAT  More Ambulation?: No  Ambulation 1  Device: Rolling Walker  Assistance: Contact guard assistance;Stand by assistance  Quality of Gait: flexed posture  Gait Deviations: Decreased step length; Slow Gogo;Decreased step height  Distance: 25'  Stairs/Curb  Stairs?: No     Balance  Posture: Good  Sitting - Static: Good  Sitting - Dynamic: Good  Standing - Static: Good  Standing - Dynamic: Fair;+(with RW)        Plan   Plan  Times per week: 3-5x/week  Current Treatment Recommendations: Strengthening, Balance Training, Functional Mobility Training, Transfer Training, Gait Training  Safety Devices  Type of devices:  All fall risk precautions in place, Call light within reach, Gait belt, Patient at risk for falls, Left in bed, Nurse notified  Restraints  Initially in place: No      AM-PAC Score  AM-PAC Inpatient Mobility Raw Score : 18 (09/28/20 1721)  AM-PAC Inpatient T-Scale Score : 43.63 (09/28/20 1721)  Mobility Inpatient CMS 0-100% Score: 46.58 (09/28/20 1721)  Mobility Inpatient CMS G-Code Modifier : CK (09/28/20 1721)          Goals  Short term goals  Time Frame for Short term goals: 9/30  Short term goal 1: Pt will be indep with all transfers  Short term goal 2: Pt will ambulate household distances with RW with supervision  Short term goal 3: Pt will complete B LE exercises to decrease risks of immobility by 9/29  Patient Goals   Patient goals : to get better       Therapy Time   Individual Concurrent Group Co-treatment   Time In 1658(evaluation only)         Time Out 1708         Minutes [address] [place] Hashimoto, PT

## 2020-09-28 NOTE — PROGRESS NOTES
Occupational Therapy   Occupational Therapy Initial Assessment and Treatment  Date: 2020   Patient Name: Justin Morales  MRN: [id number]     : 1931    Date of Service: 2020    Discharge Recommendations:  Home with Home health OT, 24 hour supervision or assist  OT Equipment Recommendations  Equipment Needed: Yes  Other: tub transfer bench, RW for home    Assessment   Performance deficits / Impairments: Decreased ADL status; Decreased functional mobility   Assessment: Patient presents s/p fall and now with decreased functional mobility and requires a RW. Patient would benefit from use of a RW and tub transfer bench at home. He also requires home therapy to maximize safe mobility in his home. Prognosis: Fair  Decision Making: Low Complexity  OT Education: OT Role;Plan of Care;Home Exercise Program;Precautions  Patient Education: disease specific: role of OT, fall risk precautions, safety and need for assist with reaching to floor  No Skilled OT: Safe to return home  REQUIRES OT FOLLOW UP: Yes  Activity Tolerance  Activity Tolerance: Patient Tolerated treatment well  Activity Tolerance: O2 91% and above throughout, patient reporting SOB, daughter reports patient completing treatment for OP pulmonary internvention, , B.P. 109/46  Safety Devices  Safety Devices in place: Yes  Type of devices: Nurse notified(left with patient)         Patient Diagnosis(es): There were no encounter diagnoses. 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   has a past surgical history that includes back surgery and other surgical history.       Restrictions  Restrictions/Precautions  Restrictions/Precautions: Fall Risk, Weight Bearing  Lower Extremity Weight Bearing Restrictions  Right Lower Extremity Weight Bearing: Weight Bearing As Tolerated  Left Lower (degrees)  LUE AROM : WFL  RUE AROM (degrees)  RUE AROM : WFL  LUE Strength  Gross LUE Strength: WFL  LUE Strength Comment: WFL for ADLs  RUE Strength  Gross RUE Strength: WFL  RUE Strength Comment: WFL for ADLs    Plan   Plan  Times per week: 3-5x/week in acute    AM-PAC Score  AM-PAC Inpatient Daily Activity Raw Score: 21 (09/28/20 1721)  AM-PAC Inpatient ADL T-Scale Score : 44.27 (09/28/20 1721)  ADL Inpatient CMS 0-100% Score: 32.79 (09/28/20 1721)  ADL Inpatient CMS G-Code Modifier : CJ (09/28/20 1721)    Goals  Short term goals  Time Frame for Short term goals: 1 week  Short term goal 1: Patient will be mod A with LE dressing with AD PRN. Short term goal 2: Patient will complete standing level grooming tasks with rest breaks PRN and appropriate safety with AD PRN. Patient Goals   Patient goals : 1 week until 10/5/20       Therapy Time   Individual Concurrent Group Co-treatment   Time In 1645         Time Out 7285         Minutes 13         Timed Code Treatment Minutes: 5 Minutes     Treatment included education in safe transfer, education in assist for LE ADLs, education in importance of equipment for safety in home environment     Ayaka Aleman OTR/L      If this patient discharges from acute care setting prior to completion of discharge summary, let this note serve as discharge note as it was functioning as assessed prior to discharge. Thank you.   VIJAY Lakhani/L

## 2020-09-28 NOTE — ED NOTES

## 2020-09-28 NOTE — ED NOTES
Warm blanket provided for pt and family.  All fall precautions reinitiated after [address], RN  09/28/20 3424

## 2020-09-28 NOTE — ED PROVIDER NOTES
SURGICAL HISTORY       Past Surgical History:   Procedure Laterality Date    BACK SURGERY      OTHER SURGICAL HISTORY      ulcer rupture. CURRENT MEDICATIONS       Discharge Medication List as of 9/28/2020  5:57 PM      CONTINUE these medications which have NOT CHANGED    Details   hydrOXYzine (ATARAX) 25 MG tablet TK 1 T PO NIGHTLY PRF ITCHINGHistorical Med      budesonide-formoterol (SYMBICORT) 160-4.5 MCG/ACT AERO Inhale 2 puffs into the lungs 2 times daily, Disp-1 Inhaler,R-3Normal      hydroCHLOROthiazide (HYDRODIURIL) 25 MG tablet Take 1 tablet by mouth every other day, Disp-90 tablet, R-3NO PRINT      busPIRone (BUSPAR) 5 MG tablet TAKE ONE TABLET BY MOUTH THREE TIMES DAILY AS NEEDED FOR ANXIETY, Disp-90 tablet, R-3Normal      atorvastatin (LIPITOR) 10 MG tablet TAKE ONE TABLET BY MOUTH DAILY, Disp-90 tablet, R-3Normal      Latanoprostene Bunod (VYZULTA) 0.024 % SOLN Apply to eyeHistorical Med      triamcinolone (KENALOG) 0.1 % cream Apply to affected areas on legs BID for 2 weeks, then BID sparingly prn flares no more than 2 days per week, Disp-60 g, R-1, Normal      Spacer/Aero-Holding Chambers (E-Z SPACER) MANN DAILY PRN Starting Fri 9/20/2019, Disp-1 Device, R-0, Normal      Elastic Bandages & Supports (LUMBAR BACK BRACE/SUPPORT PAD) MISC DAILY PRN Starting Wed 6/12/2019, Disp-1 each, R-0, Print      ALPHAGAN P 0.1 % SOLN INT ONE GTT IN OU BID, R-5, DAWHistorical Med             ALLERGIES     Diclofenac and Tramadol    FAMILY HISTORY       Family History   Problem Relation Age of Onset    Cancer Other         Melanoma     Cancer Sister     Cancer Brother     Cancer Sister     Cancer Sister     Lung Cancer Son     Other Neg Hx           SOCIAL HISTORY       Social History     Socioeconomic History    Marital status:       Spouse name: None    Number of children: None    Years of education: None    Highest education level: None   Occupational History    None   Social Needs    Financial resource strain: None    Food insecurity     Worry: None     Inability: None    Transportation needs     Medical: None     Non-medical: None   Tobacco Use    Smoking status: Former Smoker     Packs/day: 1.00     Types: Cigarettes     Start date: 1941     Last attempt to quit: 2000     Years since quittin.3    Smokeless tobacco: Never Used   Substance and Sexual Activity    Alcohol use: No    Drug use: Never    Sexual activity: Not Currently   Lifestyle    Physical activity     Days per week: None     Minutes per session: None    Stress: None   Relationships    Social connections     Talks on phone: None     Gets together: None     Attends Sikhism service: None     Active member of club or organization: None     Attends meetings of clubs or organizations: None     Relationship status: None    Intimate partner violence     Fear of current or ex partner: None     Emotionally abused: None     Physically abused: None     Forced sexual activity: None   Other Topics Concern    None   Social History Narrative    None       SCREENINGS             PHYSICAL EXAM    (up to 7 for level 4, 8 ormore for level 5)     ED Triage Vitals [20 1228]   BP Temp Temp Source Pulse Resp SpO2 Height Weight   121/78 97.8 °F (36.6 °C) Oral 111 18 96 % 5' 5\" (1.651 m) 88 lb (39.9 kg)       Physical Exam  Constitutional:       General: He is not in acute distress. Appearance: Normal appearance. He is well-developed. He is not ill-appearing or toxic-appearing. Comments: Sitting in bed comfortably, speaking in full sentences, following verbal commands appropriately. Not in acute distress     HENT:      Head: Normocephalic and atraumatic. Eyes:      Conjunctiva/sclera: Conjunctivae normal.      Pupils: Pupils are equal, round, and reactive to light. Neck:      Musculoskeletal: Normal range of motion and neck supple.    Cardiovascular:      Rate and Rhythm: Normal rate and regular rhythm. Pulses:           Dorsalis pedis pulses are 2+ on the right side and 2+ on the left side. Heart sounds: Normal heart sounds. No murmur. No friction rub. No gallop. Pulmonary:      Effort: Pulmonary effort is normal. No respiratory distress. Breath sounds: Normal breath sounds. No decreased breath sounds, wheezing, rhonchi or rales. Abdominal:      General: Bowel sounds are normal. There is no distension. Palpations: Abdomen is soft. Tenderness: There is no abdominal tenderness. There is no guarding or rebound. Musculoskeletal: Normal range of motion. General: No deformity. Left hip: He exhibits tenderness and bony tenderness. He exhibits no swelling and no deformity. Cervical back: Normal.      Thoracic back: Normal.      Lumbar back: Normal.   Skin:     General: Skin is warm and dry. Findings: No rash. Neurological:      Mental Status: He is alert and oriented to person, place, and time. GCS: GCS eye subscore is 4. GCS verbal subscore is 5. GCS motor subscore is 6. Psychiatric:         Behavior: Behavior is cooperative. DIAGNOSTIC RESULTS     EKG: All EKG's are interpreted by the Emergency Department Physicianwho either signs or Co-signs this chart in the absence of a cardiologist.      RADIOLOGY:   Non-plain film images such as CT, Ultrasound and MRI are read by the radiologist. Plain radiographic images are visualized and preliminarily interpreted by the emergency physician with the below findings:      Interpretation per the Radiologist below, if available at the time of this note:    CT HIP LEFT WO CONTRAST   Final Result   1. Acute nondisplaced fracture of the left inferior pubic ramus. No   additional fractures are identified. Please note evaluation for subtle   nondisplaced fracture is limited due to severe osteopenia. 2. 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 3. Severe atherosclerotic disease.    4. Anasarca. CT HEAD WO CONTRAST   Final Result   1. No acute intracranial abnormality. 2. Findings compatible with age-related parenchymal volume loss and chronic   microvascular disease. XR HIP LEFT (2-3 VIEWS)   Final Result   No acute left hip fracture identified. Osteoporosis. RECOMMENDATION:   If the patient has severe pain or is unable to bear weight, more advanced   imaging would be recommended. ED BEDSIDE ULTRASOUND:   Performed by ED Physician - none    LABS:  Labs Reviewed - No data to display    All other labs were within normal range ornot returned as of this dictation. EMERGENCY DEPARTMENT COURSE and DIFFERENTIAL DIAGNOSIS/MDM:   Vitals:    Vitals:    09/28/20 1228 09/28/20 1444 09/28/20 1558   BP: 121/78 105/65 (!) 98/59   Pulse: 111 93 90   Resp: 18 16 14   Temp: 97.8 °F (36.6 °C)     TempSrc: Oral     SpO2: 96% 93% 92%   Weight: 88 lb (39.9 kg)     Height: 5' 5\" (1.651 m)           MDM    ED COURSE/MDM    -Kate Swenson is a 80 y.o. male resents ED status post fall last night. Patient complains of left hip pain, no obvious deformity ecchymosis or erythema. -X-ray of left hip shows no acute hip fracture. Osteoporosis. -CT head shows no acute abnormality. -CT of left hip shows acute nondisplaced fracture of the left inferior pubic ramus. No additional fractures identified. Please note evaluation for subtle nondisplaced fractures limited due to severe osteopenia. Mild to moderate osteoarthritis of the bilateral hips. Severe atherosclerotic disease. Anasarca.  -Patient was given Percocet 5 mg  -Consult orthopedic surgery, spoke with Dr. goyal regarding the fracture. He agrees no acute intervention is warranted, at this point it is weightbearing, ambulate as tolerated.   When we ambulate patient however if he is unable to do so may need to be admitted for placement to SNF/rehab.  -PT/OT evaluation: 24 hours super vision or assist, home with home health PT. - imaging reviewed and results discussed with patient and daughter. Daughter states that she will be taking the patient home, her  is at home all day and will therefore be able to help. Was given strict return ED precautions patient is unable to get around or if he is requiring more assistance than she is able to provide. Will be discharged with pain medication. Patient and daughter in agreement withplan, verbally confirm understanding and have no further questions/concerns. REASSESSMENT      Well appearing, non toxic, alert, oriented speaking in full sentences and hemodynamically stable upon discharge      CRITICAL CARE TIME   Total Critical Care time was 0 minutes, excluding separately reportableprocedures. There was a high probability of clinicallysignificant/life threatening deterioration in the patient's condition which required my urgent intervention. CONSULTS:  IP CONSULT TO ORTHOPEDIC SURGERY    PROCEDURES:  Unless otherwise noted below, none     Procedures    FINAL IMPRESSION      1. Closed fracture of left inferior pubic ramus, initial encounter [facility])          DISPOSITION/PLAN   DISPOSITION Decision To Discharge 09/28/2020 05:56:37 PM      PATIENT REFERREDTO:  Anh Wu DO  [address] [place] [address]  [phone]    Call in 1 day        DISCHARGE MEDICATIONS:  Discharge Medication List as of 9/28/2020  5:57 PM      START taking these medications    Details   oxyCODONE-acetaminophen (PERCOCET) 5-325 MG per tablet Take 1 tablet by mouth every 8 hours as needed for Pain for up to 3 days. , Disp-9 tablet,R-0Print                (Please note that portions of this note were completed with a voice recognition program.  Efforts were made to edit the dictations but occasionally wordsare mis-transcribed.)    Maggy Matta MD (electronically signed)  Attending Emergency Physician            Maggy Matta MD  09/29/20 9169

## 2020-09-29 NOTE — CARE COORDINATION
Ambulatory Care Coordination  ED Follow up Call    Spoke with his daughter, Myra Pereyra. She states that he is currently staying with her and her spouse. He has been alternating staying with her and her sister, Karen Villatoro for the past year or so. Myra Pereyra states that he is OK sitting in a sitting position, but has been unable to bear much weight on his left side. PT/OT evaluated him in the ED yesterday and recommended home with 24/7 assist and C. Myra Pereyra states that she works most days, but that her  can be home with him. Myra Pereyra states that prior to the fall, he was not very active and has been losing weight. He was scheduled for some f/u Pulmonary testing on 10/6, but Myra Pereyra called to cancel because of everything going on with his fall, etc. She plans to reschedule once he is feeling better and can get around. She is currently on her way to a friend's house to  a walker for him. He has a rolling walker at home, but the PT said that it wouldn't be the safest option for him. Myra Pereyra denies any symptoms concerning for COVID-19 such as fever, chills, body aches, cough, or worsening SOB. For home health care, he will need a RN, PT/OT, and health aide. [facility] will route a message to Dr. Christina Sigala for an order. Reason for ED visit:  Fall   Status:     not changed    Did you call your PCP prior to going to the ED? Not Applicable      Did you receive a discharge instructions from the Emergency Room? Yes  Review of Instructions:     Understands what to report/when to return?:  Yes   Understands discharge instructions?:  Yes   Following discharge instructions?:  Yes   If not why? N/A    Are there any new complaints of pain? No  New Pain Meds? Yes    Constipation prophylaxis needed? No; he uses mineral oil     If you have a wound is the dressing clean, dry, and intact? N/A  Understands wound care regimen? N/A    Are there any other complaints/concerns that you wish to tell your provider?    No    FU appts/Provider: Future Appointments   Date Time Provider Kaleb Honeycutt   10/9/2020 11:40 AM Hermelinda Morin, DO [facility]     New Medications?:   Yes      Medication Reconciliation by phone - Yes  Understands Medications? Yes  Taking Medications? Yes  Can you swallow your pills? Yes    Any further needs in the home i.e. Equipment?   Yes    Link to services in community?:  Yes   Which services:  [facility]

## 2020-09-29 NOTE — TELEPHONE ENCOUNTER
Patient called [address] contact center [facility]) with red flag complaint; transferred for triage by [facility]. Daughter calling to set up hospital follow appointment. No new or worsening symptoms. Duplicate call, call not triaged    Soft transfer to ECC (Sandra) to schedule appointment as recommended. Attention Provider: Thank you for allowing me to participate in the care of your patient. The patient was connected to triage in response to information provided to the ECC. Please do not respond through this encounter as the response is not directed to a shared pool.        Reason for Disposition   Caller has already spoken with the PCP (or office), and has no further questions    Protocols used: NO CONTACT OR DUPLICATE CONTACT CALL-ADULT-[place]

## 2020-09-29 NOTE — DISCHARGE INSTR - COC
Continuity of Care Form    Patient Name: Mackie Cheadle   :  1931  MRN:  [id number]    Admit date:  2020  Discharge date:  ***    Code Status Order: No Order   Advance Directives:     Admitting Physician:  No admitting provider for patient encounter. PCP: Miguel Bolivar DO    Discharging Nurse: [facility] Unit/Room#:   Discharging Unit Phone Number: ***    Emergency Contact:   Extended Emergency Contact Information  Primary Emergency Contact: Babita Arndt of [address] [place] [address] Phone: [phone]  Relation: Child  Secondary Emergency Contact: Mauro Singh of [address] [place] [address] Phone: [phone]  Relation: Child    Past Surgical History:  Past Surgical History:   Procedure Laterality Date    BACK SURGERY      OTHER SURGICAL HISTORY      ulcer rupture.        Immunization History:   Immunization History   Administered Date(s) Administered    Influenza A (U6C2-21) Vaccine Nasal 10/25/2010    Influenza Vaccine, unspecified formulation 10/25/2010, 10/13/2011, 10/17/2012, 10/11/2013, 10/01/2014    Influenza Virus Vaccine 2000, 2001, 2002, 2006, 10/25/2010, 10/13/2011, 10/17/2012    Influenza Whole 10/13/2011    Influenza, High Dose (Fluzone 65 yrs and older) 10/11/2013, 10/01/2014, 10/17/2017, 10/03/2018    Influenza, Triv, inactivated, subunit, adjuvanted, IM (Fluad 65 yrs and older) 2019    Pneumococcal Conjugate 13-valent (Hzdxvlz60) 2017    Pneumococcal Polysaccharide (Mmywklzds56) 10/17/2012    Tdap (Boostrix, Adacel) 2019       Active Problems:  Patient Active Problem List   Diagnosis Code    Hyperlipidemia E78.5    Osteoporosis M81.0    Anemia:controlled D64.9    Compression fracture of L2 ([address] S32.020A    Thoracic vertebral fracture ([address] S22.009A    Lumbar compression fracture (HCC) S32.000A    L4-L5 disc bulge M51.26    Bulging lumbar disc: L3-4 mild, L4-5 mild, L5-S1 mild M51.26    Grieving F43.21    Current moderate episode of major depressive disorder without prior episode (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    Weakness R53.1    Acute cystitis without hematuria N30.00    Poor appetite R63.0    Underweight R63.6       Isolation/Infection:   Isolation          No Isolation        Patient Infection Status     None to display          Nurse Assessment:  Last Vital Signs: BP (!) 98/59   Pulse 90   Temp 97.8 °F (36.6 °C) (Oral)   Resp 14   Ht 5' 5\" (1.651 m)   Wt 88 lb (39.9 kg)   SpO2 92%   BMI 14.64 kg/m²     Last documented pain score (0-10 scale): Pain Level: 6  Last Weight:   Wt Readings from Last 1 Encounters:   09/28/20 88 lb (39.9 kg)     Mental Status:  {IP PT MENTAL STATUS:20030}    IV Access:  {[facility] IV ACCESS:951647914}    Nursing Mobility/ADLs:  Walking   {[facility] DME WVFR:667313474}  Transfer  {[facility] DME PCJE:895425163}  Bathing  {[facility] DME AWVK:012568611}  Dressing  {[facility] DME CGEU:012209854}  Toileting  {[facility] DME HCZA:399484833}  Feeding  {[facility] DME UTTL:414825512}  Med Admin  {[facility] DME XSJS:498660521}  Med Delivery   {[facility] MED Delivery:260573929}    Wound Care Documentation and Therapy:        Elimination:  Continence:   · Bowel: {YES / ZK:65597}  · Bladder: {YES / GM:13975}  Urinary Catheter: {Urinary Catheter:055226169}   Colostomy/Ileostomy/Ileal Conduit: {YES / DP:00318}       Date of Last BM: ***  No intake or output data in the 24 hours ending 09/29/20 0006  No intake/output data recorded.     Safety Concerns:     [address] Safety Concerns:986736262}    Impairments/Disabilities:      [address] Impairments/Disabilities:426981715}    Nutrition Therapy:  Current Nutrition Therapy:   [address] Diet List:445235218}    Routes of Feeding: {[facility] DME Other Feedings:217419549}  Liquids: {Slp liquid thickness:77740}  Daily Fluid Restriction: {PAPO OATES Yes amt YTCSKYF:084990926}  Last Modified Barium Swallow with Video (Video Swallowing Test): {Done Not Done WMBY:945813249}    Treatments at the Time of Hospital Discharge:   Respiratory Treatments: ***  Oxygen Therapy:  {Therapy; copd oxygen:20487}  Ventilator:    { CC Vent KYBA:100978003}    Rehab Therapies: {THERAPEUTIC INTERVENTION:4075906521}  Weight Bearing Status/Restrictions: {[facility] Weight Bearin}  Other Medical Equipment (for information only, NOT a DME order):  {EQUIPMENT:082086548}  Other Treatments: ***    Patient's personal belongings (please select all that are sent with patient):  {[facility] DME Belongings:526559876}    RN SIGNATURE:  {Esignature:911180400}    CASE MANAGEMENT/SOCIAL WORK SECTION    Inpatient Status Date: ***    Readmission Risk Assessment Score:  Readmission Risk              Risk of Unplanned Readmission:        0           Discharging to Facility/ Agency   · Name:   · Address:  · Phone:  · Fax:    Dialysis Facility (if applicable)   · Name:  · Address:  · Dialysis Schedule:  · Phone:  · Fax:    / signature: {Esignature:446034832}    PHYSICIAN SECTION    Prognosis: {Prognosis:9742350223}    Condition at Discharge: [address] [place] [address] Patient Condition:696629898}    Rehab Potential (if transferring to Rehab): {Prognosis:6929769985}    Recommended Labs or Other Treatments After Discharge: ***    Physician Certification: I certify the above information and transfer of Sumaya Richardson  is necessary for the continuing treatment of the diagnosis listed and that he requires {Admit to Appropriate Level of Care:75196} for {GREATER/LESS:982736891} 30 days.      Update Admission H&P: {P DME Changes in ZIATC:135372808}    PHYSICIAN SIGNATURE:  {Esignature:622955236}

## 2020-09-29 NOTE — CARE COORDINATION
Called and spoke with Vasyl Barrera at [facility]. She states that the notes from the ED would suffice as a \"face to face. \" She requested the information be faxed to her at [phone].

## 2020-09-29 NOTE — TELEPHONE ENCOUNTER
Patient cancelled appointment on 10/6/20 with Dr. Reno Bello for CT and PFT fu.    Reason: Pt had an accident and is going to be laid up for awhile and is unable to move around or walk    Patient did not reschedule appointment. Appointment rescheduled for (Daughter states she will call back when he is ambulatory enough to get testing. ).

## 2020-10-01 NOTE — CARE COORDINATION
Kelly Wade called [facility] and left a VM. She states that he has been getting around OK with the walker that she got for him and that she canceled PT/OT for now. Called her back to discuss. She states that he is going to be staying with her sister, Marcel Ramos for a bit. She states that he is doing \"really well\" and is getting to and from the bathroom without any difficulty. She states that he has a f/u appointment with Dr. Zulema Granados next week. She v/u that she will need to call and reschedule his 6mw test, CT scan, and PFT with Pulmonary. Encouraged her to call with any needs. Will f/u with her next week.     Future Appointments   Date Time Provider Kaleb Honeycutt   10/9/2020 11:40 AM DO LISA Christian

## 2020-10-01 NOTE — TELEPHONE ENCOUNTER
Future Appointments   Date Time Provider Kaleb Honeycutt   10/9/2020 11:40 AM DO LISA Agee [facility]   Last ov 8/14/20

## 2020-10-02 NOTE — TELEPHONE ENCOUNTER
Daughter states that patient is down to 88 lbs. He has a pelvic fracture at this time. She wanted a test for Cancer. I saw that  Dr. Mayra Bah  had ordered a CT scan, advised to get that scheduled.   Keep appointment next week with Dr. Luzma Loya

## 2020-10-09 PROBLEM — S32.592A CLOSED FRACTURE OF LEFT INFERIOR PUBIC RAMUS (HCC): Status: ACTIVE | Noted: 2020-01-01

## 2020-10-09 NOTE — PROGRESS NOTES
[facility] up from [facility]  He fell at home and 9/28. Was having hip pain and leg pain, went to ER, imaging done. Imaging showed a closed fracture of the left inferior pubic ramus. Orthopedics Dr. goyal was consulted and recommended no treatment. Mr. Leah Saul was having problems with ambulation so the possibility of skilled nursing facility or rehab facility was discussed but the daughter and son are able to take care of him at home and so he was released to home. PT/OT was ordered. CT OF THE LEFT HIP WITHOUT CONTRAST 9/28/2020 2:15 pm         TECHNIQUE:    CT of the left hip was performed without the administration of intravenous    contrast.  Multiplanar reformatted images are provided for review.  Dose    modulation, iterative reconstruction, and/or weight based adjustment of the    mA/kV was utilized to reduce the radiation dose to as low as reasonably    achievable.         COMPARISON:    Left hip radiograph September 28, 2020; MRI lumbar spine June 25, 2019         HISTORY    ORDERING SYSTEM PROVIDED HISTORY: left hip pain sp fall    TECHNOLOGIST PROVIDED HISTORY:    Reason for exam:->left hip pain sp fall    Reason for Exam: left hip pain after fall    Acuity: Acute    Type of Exam: Initial    Mechanism of Injury: fall         FINDINGS:    Bones: Bones are severely osteopenic.  Acute nondisplaced fracture of the    left inferior pubic ramus.  No additional fractures are identified.  Please    note evaluation for subtle nondisplaced fractures is limited due to severe    osteopenia.  Chronic compression deformities of the L4 and L5 vertebral    bodies similar to MRI from June 25, 2019         Soft Tissue:  Visualized intrapelvic structures demonstrate severe    atherosclerotic disease.  No bowel obstruction.  The patient appears    cachectic.  Surgical clips project along the right abdominal wall.  Soft    tissues demonstrate anasarca.  Moderate volume of stool at the rectal vault.         Joint:  Anatomic alignment of the hips.  Mild to moderate degenerative    changes of the bilateral hips.              Impression    1. Acute nondisplaced fracture of the left inferior pubic ramus.  No    additional fractures are identified.  Please note evaluation for subtle    nondisplaced fracture is limited due to severe osteopenia. 2. 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 3. Severe atherosclerotic disease. 4. Anasarca. Stable chest CT. No change in underlying emphysema, a few punctate pulmonary  nodules, and scattered reticular opacities, either due to scarring or  nonspecific fibrosis.     Tiny nonobstructing renal calculi    I discussed the above CT results. He is due to be scheduled for walk test to see if he qualifies for oxygen. His oxygen today is good. His daughter complains that he is has thick thick mucus and he has a very difficult time coughing it up. Post-Discharge Transitional Care Management Services or Hospital Follow Up      Higinio Sylvester   YOB: 1931    Date of Office Visit:  10/9/2020  Date of Hospital Admission: 9/28/20  Date of Hospital Discharge: 9/28/20  Readmission Risk Score(high >=14%.  Medium >=10%):No data recorded    Care management risk score Rising risk (score 2-5) and Complex Care (Scores >=6): 2   spoike to care coordination nurse on 9/29/20    Call initiated 2 business days of discharge: see above    Patient Active Problem List   Diagnosis    Hyperlipidemia    Osteoporosis    Anemia:controlled    Compression fracture of L2 ([address]    Thoracic vertebral fracture (HCC)    Lumbar compression fracture (HCC)    L4-L5 disc bulge    Bulging lumbar disc: L3-4 mild, L4-5 mild, L5-S1 mild    Grieving    Current moderate episode of major depressive disorder without prior episode ([address]    PAD (peripheral artery disease) ([address]    Pulmonary emphysema ([address]    Pulmonary fibrosis (HCC)    Squamous cell carcinoma of left ear    Weakness    Acute cystitis without hematuria    Poor appetite    Underweight    Closed fracture of left inferior pubic ramus (HCC)       Allergies   Allergen Reactions    Diclofenac Rash    Tramadol Nausea And Vomiting       Medications listed as ordered at the time of discharge from hospital   [place], [address] Medication Instructions BERENICE:    Printed on:10/09/20 1150   Medication Information                      ALPHAGAN P 0.1 % SOLN  INT ONE GTT IN OU BID             atorvastatin (LIPITOR) 10 MG tablet  TAKE ONE TABLET BY MOUTH DAILY             budesonide-formoterol (SYMBICORT) 160-4.5 MCG/ACT AERO  Inhale 2 puffs into the lungs 2 times daily             busPIRone (BUSPAR) 5 MG tablet  TAKE ONE TABLET BY MOUTH THREE TIMES DAILY AS NEEDED FOR ANXIETY             Elastic Bandages & Supports (LUMBAR BACK BRACE/SUPPORT PAD) MISC  1 each by Does not apply route daily as needed (Elastic lumbar soft brace)             guaiFENesin (MUCINEX) 600 MG extended release tablet  Take 2 tablets by mouth 2 times daily             hydroCHLOROthiazide (HYDRODIURIL) 25 MG tablet  Take 1 tablet by mouth every other day             hydrOXYzine (ATARAX) 25 MG tablet  TK 1 T PO NIGHTLY PRF ITCHING             Latanoprostene Bunod (VYZULTA) 0.024 % SOLN  Apply to eye             Spacer/Aero-Holding Chambers (E-Z SPACER) MANN  1 Device by Does not apply route daily as needed (inhaler use)             triamcinolone (KENALOG) 0.1 % cream  Apply to affected areas on legs BID for 2 weeks, then BID sparingly prn flares no more than 2 days per week                   Medications marked \"taking\" at this time  Outpatient Medications Marked as Taking for the 10/9/20 encounter (Office Visit) with Elyse Rojas, DO   Medication Sig Dispense Refill    guaiFENesin (MUCINEX) 600 MG extended release tablet Take 2 tablets by mouth 2 times daily 60 tablet 0    hydrOXYzine (ATARAX) 25 MG tablet TK 1 T PO NIGHTLY PRF ITCHING 30 tablet 2    budesonide-formoterol Pulse: 99   Temp: 98.7 °F (37.1 °C)   SpO2: 97%   Weight: 85 lb (38.6 kg)   Height: 5' 5\" (1.651 m)     Body mass index is 14.14 kg/m². Wt Readings from Last 3 Encounters:   10/09/20 85 lb (38.6 kg)   09/28/20 88 lb (39.9 kg)   09/17/20 88 lb (39.9 kg)     BP Readings from Last 3 Encounters:   10/09/20 98/70   09/28/20 (!) 98/59   09/17/20 128/85       Physical Exam  Constitutional:       General: He is not in acute distress. Appearance: He is well-developed. He is not diaphoretic. HENT:      Head: Normocephalic and atraumatic. Eyes:      Conjunctiva/sclera: Conjunctivae normal.   Cardiovascular:      Rate and Rhythm: Normal rate and regular rhythm. Heart sounds: Normal heart sounds. Pulmonary:      Effort: Pulmonary effort is normal.      Breath sounds: Normal breath sounds. No stridor. No wheezing. Skin:     General: Skin is warm and dry. Coloration: Skin is not pale. Comments: At the bony prominence of his thoracic spine about T10 he has a superficial abrasion. This is suspected to be due from his spine rubbing against his chair or bed. Wound care discussed and also recommend using a doughnut to perform pressure. Neurological:      Mental Status: He is alert and oriented to person, place, and time. Coordination: Coordination normal.   Psychiatric:         Behavior: Behavior normal.         Thought Content: Thought content normal.         Judgment: Judgment normal.             Assessment/Plan:  1. Hospital ER discharge follow-up      2. Closed fracture of left inferior pubic ramus with routine healing, subsequent encounter  Due to fall    3. Osteopenia, unspecified location      4. Primary osteoarthritis of both hips      5.  Atherosclerosis          Medical Decision Making: moderately complex      Radhames Metzger was seen today for follow-up from hospital.    Diagnoses and all orders for this visit:    Hospital discharge follow-up  -     SC DISCHARGE MEDS RECONCILED W/ CURRENT OUTPATIENT MED LIST    Closed fracture of left inferior pubic ramus with routine healing, subsequent encounter    Osteopenia, unspecified location    Primary osteoarthritis of both hips    Atherosclerosis    Other orders  -     INFLUENZA, QUADV, ADJUVANTED, 65 YRS =, IM, PF, PREFILL SYR, 0.5ML (FLUAD)  -     guaiFENesin (MUCINEX) 600 MG extended release tablet;  Take 2 tablets by mouth 2 times daily

## 2020-10-09 NOTE — PROGRESS NOTES
Vaccine Information Sheet, \"Influenza - Inactivated\"  given to Vesna Wu, or parent/legal guardian of  Vesna Wu and verbalized understanding. Patient responses:    Have you ever had a reaction to a flu vaccine? No  Do you have any current illness? No  Have you ever had Guillian [place] Syndrome? No  Do you have a serious allergy to any of the follow: Neomycin, Polymyxin, Thimerosal, eggs or egg products? No    Flu vaccine given per order. Please see immunization tab. Risks and benefits explained. Current VIS given.

## 2020-10-13 NOTE — CARE COORDINATION
Ambulatory Care Coordination Note  10/13/2020  CM Risk Score: 2  Charlson 10 Year Mortality Risk Score: 98%     ACC: Juan Carlos Abdalla RN    Summary Note: Spoke with his daughter, Dax Montilla. She states that her Dad is staying with her sister for awhile so she can get caught up on work, etc. She states that he is \"still weak,\" but is getting around [place] with his walker. She states that he is still \"sore,\" but isn't complaining of increased pain. He has had a cough that has been worsening; productive of clear sputum. He saw Dr. Edmond Lr on Friday and was given a Rx for Mucinex. His CT was stable. She is planning on rescheduling his 6mw test after he \"heals some more\" from his pelvic fracture. Discussed planning moving forward and need for assistance in the home. Dax Montilla states that she knows she will need help with his care. She mentioned Hospice as an option, but has yet to discuss it with her sister. She states that her sister is hesitant to allow people into her home such as caregivers, etc. Also discussed [address] as an option for personal hygiene assistance. He will likely qualify for Hospice based on his lung diagnosis and decreasing weight. Plan     -Encouraged Dax Montilla to discuss Hospice and/or [address] with her sister and to call ACM with any questions   -Will f/u in 1-2 weeks    Wt Readings from Last 3 Encounters:   10/09/20 85 lb (38.6 kg)   09/28/20 88 lb (39.9 kg)   09/17/20 88 lb (39.9 kg)         Care Coordination Interventions    Program Enrollment:  Complex Care  Referral from Primary Care Provider:  No  Suggested Interventions and [address] Waiver:  Not Started  Home Health Services:  Completed (Comment: [id number] referral sent- 9/2020; family put on hold)  Hospice:   In Process (Comment: Family considering)  Medi Set or Pill Pack:  Completed  Senior Services:  Not Started  Social Work:  Not Started  Specialty Services Referral:  Completed (Comment: Dr. Jimena Waite- [facility])         Goals Addressed    None         Prior to Admission medications    Medication Sig Start Date End Date Taking? Authorizing Provider   guaiFENesin (MUCINEX) 600 MG extended release tablet Take 2 tablets by mouth 2 times daily 10/9/20   Nathaniel Ramsey, DO   hydrOXYzine (ATARAX) 25 MG tablet TK 1 T PO NIGHTLY PRF ITCHING 10/2/20   Nathaniel Shivers, DO   budesonide-formoterol [facility]) 160-4.5 MCG/ACT AERO Inhale 2 puffs into the lungs 2 times daily 8/14/20   Nathaniel Shivers, DO   hydroCHLOROthiazide (HYDRODIURIL) 25 MG tablet Take 1 tablet by mouth every other day 7/8/20   CROW Petersen CNP   busPIRone (BUSPAR) 5 MG tablet TAKE ONE TABLET BY MOUTH THREE TIMES DAILY AS NEEDED FOR ANXIETY 3/30/20   Nathaniel Shivers, DO   atorvastatin (LIPITOR) 10 MG tablet TAKE ONE TABLET BY MOUTH DAILY 3/23/20   Nathaniel Knightvers, DO   Latanoprostene Bunod (VYZULTA) 0.024 % SOLN Apply to eye    Historical Provider, MD   triamcinolone (KENALOG) 0.1 % cream Apply to affected areas on legs BID for 2 weeks, then BID sparingly prn flares no more than 2 days per week 2/10/20   Erika Presume, DO   Spacer/Aero-Holding Chambers (E-Z SPACER) MANN 1 Device by Does not apply route daily as needed (inhaler use) 9/20/19   Nathaniel Knightvers, DO   Elastic Bandages & Supports (LUMBAR BACK BRACE/SUPPORT PAD) MISC 1 each by Does not apply route daily as needed (Elastic lumbar soft brace) 6/12/19   Rockie Mortimer, MD   ALPHAGAN P 0.1 % SOLN INT ONE GTT IN OU BID 5/7/19   Historical Provider, MD       No future appointments.    and   COPD Assessment    Does the patient understand envrionmental exposure?:  Yes  Is the patient able to verbalize Rescue vs. Long Acting medications?:  Yes  Does the patient have a nebulizer?:  No  Does the patient use a space with inhaled medications?:  Yes     Increase in cough         Symptoms:

## 2020-11-02 NOTE — TELEPHONE ENCOUNTER
Jimenez's daughter Carmen Snell states patient is losing weight and does not want to eat, he just pick at his food and getting weaker and weaker. Carmen Snell is asking for hospice referral for patient. No future appointments. Past appointment was 10/9/20.

## 2020-11-03 NOTE — PROGRESS NOTES
Referral to hospice done. Give them info on how to arrange it.  You can ask our nurse care coordinator if any questions

## 2020-11-03 NOTE — TELEPHONE ENCOUNTER
Referral for hospice and demographics faxed to [phone] / confirmation received     Patients daughter notified

## 2020-11-05 NOTE — TELEPHONE ENCOUNTER
Carissa Bills called back we gave the verbal to start certification based on malnutrition diagnosis. Also advised we would have Dr. Art Purvis review everything else tomorrow.

## 2020-11-05 NOTE — TELEPHONE ENCOUNTER
Marilyn Neumann with [facility] called. She did an initial eval today. She is asking if you will follow patient? Would need a verbal order with the dx of protein calorie malnutrition. He is 77 lbs, weak, no strength, really not eating. He does not want any heroic measures taken. BP was 90/60. Med changes/orders: May they discontinue statin medication? Also can his HCTZ be PRN, he currently has no swelling and is dehydrated. He has been taking ibuprofen for hip fracture/pain. She had him discontinue it is bothering his stomach because he is not eating.  Would domingo and order for liquid Tylenol PRN    Also would like a verbal for comfort meds PRN and Oxygen PRN

## 2020-11-09 NOTE — TELEPHONE ENCOUNTER
[facility] [address] [place] [address] Drive called to get verbal for comfort care medications: Morphine & Alisa Card

## 2020-11-09 NOTE — CARE COORDINATION
Patient Excluded from Care Coordination? Yes     The patient will be excluded from Care Coordination for the following reason: Patient enrolled in Hospice/Palliative Care ; Patient newly enrolled with Hospice. ACM signing off. No future appointments. Heather ALONZO, RN  Ambulatory Care Manager  [phone]  [email or web address]

## 2020-12-02 ENCOUNTER — TELEPHONE (OUTPATIENT)
Dept: FAMILY MEDICINE CLINIC | Age: 85
End: 2020-12-02

## 2020-12-02 RX ORDER — BUSPIRONE HYDROCHLORIDE 5 MG/1
TABLET ORAL
Qty: 90 TABLET | Refills: 3 | Status: SHIPPED | OUTPATIENT
Start: 2020-12-02

## 2020-12-02 NOTE — TELEPHONE ENCOUNTER
Pt daughter called requesting medication-      busPIRone (BUSPAR) 5 MG tablet    Pharmacy:  [facility] [address] [place] [address], [address]     Last appt. 10/9/2020    No future appointments.

## 2020-12-09 RX ORDER — BUDESONIDE AND FORMOTEROL FUMARATE DIHYDRATE 160; 4.5 UG/1; UG/1
2 AEROSOL RESPIRATORY (INHALATION) 2 TIMES DAILY
Qty: 1 INHALER | Refills: 5 | Status: SHIPPED | OUTPATIENT
Start: 2020-12-09

## 2021-03-26 ENCOUNTER — TELEPHONE (OUTPATIENT)
Dept: FAMILY MEDICINE CLINIC | Age: 86
End: 2021-03-26

## 2021-03-26 NOTE — TELEPHONE ENCOUNTER
Daughter of pt Aly Fisher) called stating that she received a message for the pt to schedule an annual wellness visit. Betty Castaneda stated that Vivian Watson has passed away in December.

## 2023-04-26 NOTE — TELEPHONE ENCOUNTER
Pt daughter Bertha Ayon would like a call, she has some concerns about her father.       Please contact- [phone] Stable

## 2023-05-19 NOTE — TELEPHONE ENCOUNTER
I called Marly Nixon she stated that Jimenez's breathing is getting worse and they would like to know if you can order a chest Xray or Ct of the chest and lungs to make sure everything is ok and not getting worse. No
